# Patient Record
Sex: FEMALE | Race: WHITE | HISPANIC OR LATINO | Employment: UNEMPLOYED | URBAN - NONMETROPOLITAN AREA
[De-identification: names, ages, dates, MRNs, and addresses within clinical notes are randomized per-mention and may not be internally consistent; named-entity substitution may affect disease eponyms.]

---

## 2018-07-27 ENCOUNTER — HISTORICAL (OUTPATIENT)
Dept: ADMINISTRATIVE | Facility: HOSPITAL | Age: 53
End: 2018-07-27

## 2018-07-31 ENCOUNTER — HISTORICAL (OUTPATIENT)
Dept: ADMINISTRATIVE | Facility: HOSPITAL | Age: 53
End: 2018-07-31

## 2018-08-13 ENCOUNTER — HISTORICAL (OUTPATIENT)
Dept: ADMINISTRATIVE | Facility: HOSPITAL | Age: 53
End: 2018-08-13

## 2018-08-15 ENCOUNTER — HISTORICAL (OUTPATIENT)
Dept: ADMINISTRATIVE | Facility: HOSPITAL | Age: 53
End: 2018-08-15

## 2018-12-13 ENCOUNTER — HISTORICAL (OUTPATIENT)
Dept: ADMINISTRATIVE | Facility: HOSPITAL | Age: 53
End: 2018-12-13

## 2019-02-10 ENCOUNTER — HISTORICAL (OUTPATIENT)
Dept: ADMINISTRATIVE | Facility: HOSPITAL | Age: 54
End: 2019-02-10

## 2019-02-13 ENCOUNTER — HISTORICAL (OUTPATIENT)
Dept: ADMINISTRATIVE | Facility: HOSPITAL | Age: 54
End: 2019-02-13

## 2019-02-15 ENCOUNTER — HISTORICAL (OUTPATIENT)
Dept: ADMINISTRATIVE | Facility: HOSPITAL | Age: 54
End: 2019-02-15

## 2019-02-16 ENCOUNTER — HISTORICAL (OUTPATIENT)
Dept: ADMINISTRATIVE | Facility: HOSPITAL | Age: 54
End: 2019-02-16

## 2019-06-04 ENCOUNTER — HISTORICAL (OUTPATIENT)
Dept: ADMINISTRATIVE | Facility: HOSPITAL | Age: 54
End: 2019-06-04

## 2019-08-14 ENCOUNTER — HISTORICAL (OUTPATIENT)
Dept: ADMINISTRATIVE | Facility: HOSPITAL | Age: 54
End: 2019-08-14

## 2019-08-21 ENCOUNTER — HISTORICAL (OUTPATIENT)
Dept: ADMINISTRATIVE | Facility: HOSPITAL | Age: 54
End: 2019-08-21

## 2019-11-08 ENCOUNTER — HISTORICAL (OUTPATIENT)
Dept: ADMINISTRATIVE | Facility: HOSPITAL | Age: 54
End: 2019-11-08

## 2020-03-04 ENCOUNTER — HISTORICAL (OUTPATIENT)
Dept: ADMINISTRATIVE | Facility: HOSPITAL | Age: 55
End: 2020-03-04

## 2021-05-11 ENCOUNTER — HISTORICAL (OUTPATIENT)
Dept: ADMINISTRATIVE | Facility: HOSPITAL | Age: 56
End: 2021-05-11

## 2021-06-14 ENCOUNTER — HISTORICAL (OUTPATIENT)
Dept: ADMINISTRATIVE | Facility: HOSPITAL | Age: 56
End: 2021-06-14

## 2021-08-04 LAB
ABS NEUT (OLG): 2.4 X10(3)/MCL (ref 1.5–6.9)
ALBUMIN SERPL-MCNC: 3.3 GM/DL (ref 3.5–5)
ALBUMIN/GLOB SERPL: 1.2 RATIO (ref 1.1–2)
ALP SERPL-CCNC: 103 UNIT/L (ref 40–150)
ALT SERPL-CCNC: 16 UNIT/L (ref 0–55)
APTT PPP: 31.4 SEC (ref 23.4–34.9)
AST SERPL-CCNC: 15 UNIT/L (ref 5–34)
BASOPHILS # BLD AUTO: 0 X10(3)/MCL (ref 0–0.1)
BASOPHILS NFR BLD AUTO: 1 % (ref 0–1)
BILIRUB SERPL-MCNC: 0.3 MG/DL
BILIRUBIN DIRECT+TOT PNL SERPL-MCNC: 0.1 MG/DL (ref 0–0.5)
BILIRUBIN DIRECT+TOT PNL SERPL-MCNC: 0.2 MG/DL (ref 0–0.8)
BUN SERPL-MCNC: 9 MG/DL (ref 9.8–20.1)
CALCIUM SERPL-MCNC: 9.2 MG/DL (ref 8.4–10.2)
CHLORIDE SERPL-SCNC: 108 MMOL/L (ref 98–107)
CO2 SERPL-SCNC: 30 MMOL/L (ref 22–29)
CREAT SERPL-MCNC: 0.82 MG/DL (ref 0.55–1.02)
EOSINOPHIL # BLD AUTO: 0.2 X10(3)/MCL (ref 0–0.6)
EOSINOPHIL NFR BLD AUTO: 4 % (ref 0–5)
ERYTHROCYTE [DISTWIDTH] IN BLOOD BY AUTOMATED COUNT: 12.8 % (ref 11.5–17)
EST CREAT CLEARANCE SER (OHS): 73.05 ML/MIN
GLOBULIN SER-MCNC: 2.8 GM/DL (ref 2.4–3.5)
GLUCOSE SERPL-MCNC: 96 MG/DL (ref 74–100)
HCT VFR BLD AUTO: 35.1 % (ref 36–48)
HGB BLD-MCNC: 11.4 GM/DL (ref 12–16)
IMM GRANULOCYTES # BLD AUTO: 0.02 10*3/UL (ref 0–0.02)
IMM GRANULOCYTES NFR BLD AUTO: 0.3 % (ref 0–0.43)
INR PPP: 1 (ref 2–3)
LYMPHOCYTES # BLD AUTO: 2.6 X10(3)/MCL (ref 0.5–4.1)
LYMPHOCYTES NFR BLD AUTO: 45 % (ref 15–40)
MCH RBC QN AUTO: 31 PG (ref 27–34)
MCHC RBC AUTO-ENTMCNC: 32 GM/DL (ref 31–36)
MCV RBC AUTO: 94 FL (ref 80–99)
MONOCYTES # BLD AUTO: 0.5 X10(3)/MCL (ref 0–1.1)
MONOCYTES NFR BLD AUTO: 9 % (ref 4–12)
NEUTROPHILS # BLD AUTO: 2.4 X10(3)/MCL (ref 1.5–6.9)
NEUTROPHILS NFR BLD AUTO: 42 % (ref 43–75)
PLATELET # BLD AUTO: 325 X10(3)/MCL (ref 140–400)
PMV BLD AUTO: 9.7 FL (ref 6.8–10)
POTASSIUM SERPL-SCNC: 4.1 MMOL/L (ref 3.5–5.1)
PROT SERPL-MCNC: 6.1 GM/DL (ref 6.4–8.3)
PROTHROMBIN TIME: 12.8 SEC (ref 11.7–14.5)
RBC # BLD AUTO: 3.73 X10(6)/MCL (ref 4.2–5.4)
SARS-COV-2 AG RESP QL IA.RAPID: NOT DETECTED
SODIUM SERPL-SCNC: 140 MMOL/L (ref 136–145)
WBC # SPEC AUTO: 5.9 X10(3)/MCL (ref 4.5–11.5)

## 2021-08-06 ENCOUNTER — HISTORICAL (OUTPATIENT)
Dept: ANESTHESIOLOGY | Facility: HOSPITAL | Age: 56
End: 2021-08-06

## 2021-08-18 ENCOUNTER — HISTORICAL (OUTPATIENT)
Dept: ADMINISTRATIVE | Facility: HOSPITAL | Age: 56
End: 2021-08-18

## 2021-08-28 ENCOUNTER — HISTORICAL (OUTPATIENT)
Dept: ADMINISTRATIVE | Facility: HOSPITAL | Age: 56
End: 2021-08-28

## 2021-10-13 ENCOUNTER — HISTORICAL (OUTPATIENT)
Dept: HEMATOLOGY/ONCOLOGY | Facility: CLINIC | Age: 56
End: 2021-10-13

## 2021-10-14 ENCOUNTER — HISTORICAL (OUTPATIENT)
Dept: ADMINISTRATIVE | Facility: HOSPITAL | Age: 56
End: 2021-10-14

## 2022-04-10 ENCOUNTER — HISTORICAL (OUTPATIENT)
Dept: ADMINISTRATIVE | Facility: HOSPITAL | Age: 57
End: 2022-04-10
Payer: MEDICAID

## 2022-04-25 VITALS
DIASTOLIC BLOOD PRESSURE: 68 MMHG | WEIGHT: 130.06 LBS | BODY MASS INDEX: 25.53 KG/M2 | HEIGHT: 60 IN | SYSTOLIC BLOOD PRESSURE: 110 MMHG

## 2022-04-30 NOTE — OP NOTE
ADMITTING DIAGNOSIS:  Left carpal tunnel syndrome.    PROCEDURE:  Left carpal tunnel release.    INDICATIONS FOR PROCEDURE:  Patient is a 56-year-old  female with a history of anemia, iron deficiency type, with anxiety, depressive disorders, has left carpal tunnel syndrome that required left carpal tunnel release.  Patient has had EMG studies that show moderate to severe left carpal tunnel syndrome on 07/09/2021.    DESCRIPTION OF PROCEDURE:  Patient was brought to the operating room, underwent Esmarch and tourniquet with local and IV sedation.  Prepped and draped in a sterile fashion.  Had an incision made along the palmar crease and dissection through skin and subcutaneous tissue.  The carpal fascia was opened, and the median nerve was released.  The patient underwent closure of the skin and subcu with 4-0 plain gut suture and 4-0 nylon.  Sterile dressings were applied.  No problems were encountered.  Patient tolerated the procedure well.     I appreciate the consultation referral from nurse practitioner, Ms. Kim Szymanski.        KATHIA/JULI   DD: 08/06/2021 1331   DT: 08/06/2021 1352  Job # [id number]    cc: Kim Szymanski NP

## 2022-05-13 ENCOUNTER — HISTORICAL (OUTPATIENT)
Dept: ADMINISTRATIVE | Facility: HOSPITAL | Age: 57
End: 2022-05-13

## 2022-05-17 ENCOUNTER — OFFICE VISIT (OUTPATIENT)
Dept: ORTHOPEDICS | Facility: CLINIC | Age: 57
End: 2022-05-17
Payer: MEDICAID

## 2022-05-17 VITALS
RESPIRATION RATE: 16 BRPM | BODY MASS INDEX: 27.01 KG/M2 | HEIGHT: 59 IN | SYSTOLIC BLOOD PRESSURE: 103 MMHG | DIASTOLIC BLOOD PRESSURE: 70 MMHG | WEIGHT: 134 LBS | HEART RATE: 90 BPM | OXYGEN SATURATION: 96 % | TEMPERATURE: 98 F

## 2022-05-17 DIAGNOSIS — M17.0 PRIMARY OSTEOARTHRITIS OF BOTH KNEES: Primary | ICD-10-CM

## 2022-05-17 DIAGNOSIS — M62.81 MUSCLE WEAKNESS OF LOWER EXTREMITY: ICD-10-CM

## 2022-05-17 PROCEDURE — 20610 DRAIN/INJ JOINT/BURSA W/O US: CPT | Mod: 50,S$PBB,, | Performed by: NURSE PRACTITIONER

## 2022-05-17 PROCEDURE — 99214 PR OFFICE/OUTPT VISIT, EST, LEVL IV, 30-39 MIN: ICD-10-PCS | Mod: 25,S$PBB,, | Performed by: NURSE PRACTITIONER

## 2022-05-17 PROCEDURE — 99214 OFFICE O/P EST MOD 30 MIN: CPT | Mod: PBBFAC | Performed by: NURSE PRACTITIONER

## 2022-05-17 PROCEDURE — 20610 PR DRAIN/INJECT LARGE JOINT/BURSA: ICD-10-PCS | Mod: 50,S$PBB,, | Performed by: NURSE PRACTITIONER

## 2022-05-17 PROCEDURE — 3078F PR MOST RECENT DIASTOLIC BLOOD PRESSURE < 80 MM HG: ICD-10-PCS | Mod: CPTII,,, | Performed by: NURSE PRACTITIONER

## 2022-05-17 PROCEDURE — 3008F BODY MASS INDEX DOCD: CPT | Mod: CPTII,,, | Performed by: NURSE PRACTITIONER

## 2022-05-17 PROCEDURE — 1160F RVW MEDS BY RX/DR IN RCRD: CPT | Mod: CPTII,,, | Performed by: NURSE PRACTITIONER

## 2022-05-17 PROCEDURE — 3078F DIAST BP <80 MM HG: CPT | Mod: CPTII,,, | Performed by: NURSE PRACTITIONER

## 2022-05-17 PROCEDURE — 3074F PR MOST RECENT SYSTOLIC BLOOD PRESSURE < 130 MM HG: ICD-10-PCS | Mod: CPTII,,, | Performed by: NURSE PRACTITIONER

## 2022-05-17 PROCEDURE — 1159F PR MEDICATION LIST DOCUMENTED IN MEDICAL RECORD: ICD-10-PCS | Mod: CPTII,,, | Performed by: NURSE PRACTITIONER

## 2022-05-17 PROCEDURE — 1159F MED LIST DOCD IN RCRD: CPT | Mod: CPTII,,, | Performed by: NURSE PRACTITIONER

## 2022-05-17 PROCEDURE — 4010F ACE/ARB THERAPY RXD/TAKEN: CPT | Mod: CPTII,,, | Performed by: NURSE PRACTITIONER

## 2022-05-17 PROCEDURE — 99214 OFFICE O/P EST MOD 30 MIN: CPT | Mod: 25,S$PBB,, | Performed by: NURSE PRACTITIONER

## 2022-05-17 PROCEDURE — 3074F SYST BP LT 130 MM HG: CPT | Mod: CPTII,,, | Performed by: NURSE PRACTITIONER

## 2022-05-17 PROCEDURE — 1160F PR REVIEW ALL MEDS BY PRESCRIBER/CLIN PHARMACIST DOCUMENTED: ICD-10-PCS | Mod: CPTII,,, | Performed by: NURSE PRACTITIONER

## 2022-05-17 PROCEDURE — 3008F PR BODY MASS INDEX (BMI) DOCUMENTED: ICD-10-PCS | Mod: CPTII,,, | Performed by: NURSE PRACTITIONER

## 2022-05-17 PROCEDURE — 20610 DRAIN/INJ JOINT/BURSA W/O US: CPT | Mod: PBBFAC | Performed by: NURSE PRACTITIONER

## 2022-05-17 PROCEDURE — 4010F PR ACE/ARB THEARPY RXD/TAKEN: ICD-10-PCS | Mod: CPTII,,, | Performed by: NURSE PRACTITIONER

## 2022-05-17 RX ORDER — TRIAMCINOLONE ACETONIDE 40 MG/ML
40 INJECTION, SUSPENSION INTRA-ARTICULAR; INTRAMUSCULAR
Status: COMPLETED | OUTPATIENT
Start: 2022-05-17 | End: 2022-05-17

## 2022-05-17 RX ORDER — LIDOCAINE HYDROCHLORIDE 10 MG/ML
5 INJECTION, SOLUTION EPIDURAL; INFILTRATION; INTRACAUDAL; PERINEURAL
Status: COMPLETED | OUTPATIENT
Start: 2022-05-17 | End: 2022-05-17

## 2022-05-17 RX ORDER — MELOXICAM 15 MG/1
TABLET ORAL
COMMUNITY
Start: 2022-05-14 | End: 2023-08-09

## 2022-05-17 RX ORDER — LISINOPRIL 5 MG/1
TABLET ORAL
COMMUNITY
Start: 2022-05-14

## 2022-05-17 RX ORDER — GABAPENTIN 800 MG/1
800 TABLET ORAL NIGHTLY
COMMUNITY
Start: 2021-12-04 | End: 2023-08-09

## 2022-05-17 RX ORDER — QUETIAPINE FUMARATE 400 MG/1
400 TABLET, FILM COATED ORAL DAILY
COMMUNITY
Start: 2022-04-22

## 2022-05-17 RX ORDER — DICLOFENAC SODIUM 10 MG/G
GEL TOPICAL
COMMUNITY
Start: 2021-11-24 | End: 2023-08-09

## 2022-05-17 NOTE — PROGRESS NOTES
"  Subjective:       Follow up .  Patient ID: Roya Wills is a 57 y.o. female. smoker. Employment HX: , currently employed.    Seen [facility] for same DX since 10/14/21. PCP: Dr. Arron Jeronimo, referral source same.    Chief Complaint: Pain of the Left Knee, Pain of the Right Knee, and Follow-up    HPI:    Bilateral L > R aching and shooting medial knee pain.   Injury: no known injury  Onset: several years ago fluctuates   Modifying Factors: worse with activity, improves with rest, stiffness after immobilization, improves with less than 30 minutes activity and increased at night  Associated Symptoms: crepitus, swelling, "giving out" and decreased ROM   Activity: sedentary with light activity and pain mildly interferes with ADLs   Previous Treatments:  CSI since  last injection 22, RX PT completed 4 wks/ 2 xs per week d/c'd n/a currently in PT, BMI reduction ongoing education, RX NSAIDs and HEP withTheraBand with some relief but symptoms returning  PMH: + GI bleeding  Family History: + OA    Note: Weakness in LE improving since starting PT slowly.  CSI with great relief, symptoms returning, requesting CSI today s/t symptoms affecting ADLs    Current Outpatient Medications:     diclofenac sodium (VOLTAREN) 1 % Gel, SMARTSI Gram(s) Topical 2-3 Times Daily PRN, Disp: , Rfl:     gabapentin (NEURONTIN) 800 MG tablet, Take 800 mg by mouth every evening., Disp: , Rfl:     lisinopriL (PRINIVIL,ZESTRIL) 5 MG tablet, , Disp: , Rfl:     meloxicam (MOBIC) 15 MG tablet, , Disp: , Rfl:     QUEtiapine (SEROQUEL) 400 MG tablet, Take 400 mg by mouth once daily., Disp: , Rfl:     Current Facility-Administered Medications:     LIDOcaine (PF) 10 mg/ml (1%) injection 50 mg, 5 mL, Other, 1 time in Clinic/HOD, Elle Garber, NP    LIDOcaine (PF) 10 mg/ml (1%) injection 50 mg, 5 mL, Other, 1 time in Clinic/HOD, Elle Garber, NP    triamcinolone acetonide injection 40 mg, 40 mg, " "Intramuscular, 1 time in Clinic/HOD, Elle Garber NP    triamcinolone acetonide injection 40 mg, 40 mg, Intramuscular, 1 time in Clinic/HOD, Elle Garber NP    Review of patient's allergies indicates:   Allergen Reactions    Ibuprofen      Other reaction(s): Abdominal Pain, Stomach ulcer    Sulfamethoxazole-trimethoprim Nausea And Vomiting       No results found for: LABA1C   Body mass index is 27.06 kg/m².   Vitals:    05/17/22 1049   BP: 103/70   Pulse: 90   Resp: 16   Temp: 98 °F (36.7 °C)   SpO2: 96%   Weight: 60.8 kg (134 lb)   Height: 4' 11" (1.499 m)   PainSc:   7      Review of Systems:  A ten-point review of systems was performed and is negative, except as mentioned above       Objective:        MSK Bilateral Knee    General:  apparent distress, no pain indicators                                                                                                                  Inspection: limping gait, mild effusion, full weight bearing, no erythema, no contusion, mild quad deconditioning, varus deformity   Palpation: tenderness with palpation at medial joint line, peripatellar soft tissue    ROM:     Active Flexion / Extension (0-140):  2 / 114 painful (R)  3 / 119 painful (L)    Strength:    Flexion  4 / 5 (R)   4 / 5 (L)   Extension  4 / 5 (R)   4 / 5 (L)    Special Testing:   IT Band Testing  o Tequila's Test:    + (R)  + (L)   Effusion Testing  o Ballotable Effusion:   -- (R)  -- (L)  o Fluid Wave:    -- (R)  -- (L)   Patellar Testing  o Patellar Grind:    + (R)  + (L)  o Patellar Facet Pain:   + (R)  + (L)  o Apprehension:    -- (R)  -- (L)   Meniscal Testing  o Eboni's test:     + (R)  + (L)  o Thessaly's Test:      Not tested (R)  not tested (L)   Ligament Testing  o ACL Anterior Drawer:   -- (R) -- (L)  o PCL Posterior Drawer:   -- (R) -- (L)  o LCL Varus Test:    -- (R) -- (L)  o MCL Valgus Test:   -- (R) -- (L)    Hip Exam normal  Ankle Exam normal  Neurovascular: Intact " to light touch  Neuro/ Psych: Awake, alert, oriented, normal mood and affect  Lymphatic: No LAD  Skin/ Soft Tissue: no rash, skin intact    Assessment:       Imaging: X-ray dated 10/14/21 reviewed and independently interpreted by me.  Discussed with patient. Noted no acute, DJD noted.     [facility] Radiology Report 10/14/21   XR Knee Right 4 or More Views  HISTORY:  COMPARISON: None.  FINDINGS:  No acute displaced fractures or dislocations.  There is some narrowing of the medial compartment of the knee joint  articular spaces are otherwise preserved with smooth articular  surfaces  No blastic or lytic lesions.  Soft tissues within normal limits.  IMPRESSION:  Degenerative changes  Radiology Report 10/14/21  XR Knee Left 4 or More Views  HISTORY:  COMPARISON: None.  FINDINGS:  No acute displaced fractures or dislocations.  There is some narrowing of the medial compartment of the knee joint  articular spaces are otherwise preserved on the standing projection  there is narrowing of the medial compartment of the contralateral knee  No blastic or lytic lesions.  Soft tissues within normal limits.  IMPRESSION:  Degenerative changes.    1. Primary osteoarthritis of both knees    2. BMI 27.0-27.9,adult          Plan:       Orders Placed This Encounter    triamcinolone acetonide injection 40 mg    triamcinolone acetonide injection 40 mg    LIDOcaine (PF) 10 mg/ml (1%) injection 50 mg    LIDOcaine (PF) 10 mg/ml (1%) injection 50 mg     1. Ongoing education about DX and treatment recommendations including conservative treatments of daily HEP with TheraBand, BMI reduction goal 5-10% of body weight (120# overall goal), muscle strengthening with use of stationary bike (RPM set at 80 or > with slow progression to goal of 40 minutes 3-4 times per week as tolerated), adequate vit D/C, glucosamine 1500 mg/day and daily acetaminophen 1000 mg 3 times/ day if able to tolerate.    2. Treatment plan: Mild-to-moderate knee arthritis  Currently having adequate relief with current treatment plan. Intra-articular injections today due to return of symptoms since last injection which are impacting ADLs.   Continue in PT   3. Procedure: CSI v. VS injections discussed, s/t failed conservative treatments patient consents to CSI today  4. RX Medications: continue medications as RX per PCP  5. RTC: 4 months  Elle BARNES    Ortho Procedure Note CSI  Date: 05/17/2022  Time: 11:10 AM CDT   Procedure: bilateral Knee Injection   Indications: Therapeutic Indication - Decrease pain, Increase range of motion and improve quality of life:   Risks:  Possible complications with the injection include bleeding, infection (.01%), tendon rupture, steroid flare, fat pad or soft tissue atrophy, skin depigmentation, allergic reaction to medications and vasovagal response. (steroid flare treatment is rest, ice, NSAIDs and resolves in 24-36 hours.)  Consent:  Procedure, risks, benefits, and alternatives explained the patient, who voiced understanding and agreed to proceed with procedure.  Consent signed and scanned into the medical record.   No absolute contraindications (cellulitis overlying joint, infection, lack of informed consent, allergy to injection medication, AVN protein or egg allergy for sodium hyaluronate, or history of steroid flare) or relative contraindications (uncontrolled DM2 A1c>10, coagulopathy, INR > 3.5, previous joint replacement or history of AVN).        Staff: Elle BARNES  Description:  Time-out performed.  The patient was prepped in normal sterile fashion use of chlorhexidine scrub and the appropriate and anatomic landmarks were identified.  Sterile 21 gauge 1.5 inch  was used. Topical ethyl chloride was applied. Contents of syringe included: 5cc of 1% of lidocaine with 40mg of Kenalog   Complications: None   EBL: None   Post Procedure: Patient alert, and moving all extremities. ROM improved, pain decreased.  Good peripheral  pulses, no signs of vascular compromise and range of motion intact.  Aftercare instructions were given to patient at time of discharge.  Relative rest for 3 days-avoiding excess activity.  Place ice on the area for 15 minutes every 4-6 hours. Patient may take Tylenol a 1000 mg b.i.d. or ibuprofen 600 mg t.i.d. for the next 3-4 days if not on medication already and safe to take pending co-morbidities.  Protect the area for the next 1-8 hours if anesthetic was used.  Avoid excessive activity for the next 3-4 weeks.  ER precautions given for fever, severe joint pain or allergic reaction or other new symptoms related to the joint injection.       Timed Billing Note  Total Time Spent with Patient: 30 minutes  Visit Start Time: 1145  10 minutes spent prior to exam reviewing EMR, prior labs and x-rays.  10 minutes spent in exam with patient completing exam, obtaining history, educating on DX and treatment plan.  10 minutes spent after exam completing EHR documentation.   Visit End Time: 1215

## 2022-05-27 ENCOUNTER — HISTORICAL (OUTPATIENT)
Dept: ADMINISTRATIVE | Facility: HOSPITAL | Age: 57
End: 2022-05-27

## 2022-08-05 LAB
CHLAMYDIA NUCLEIC ACID SCREEN: NEGATIVE
GONOCOCCUS BY NUCLEIC ACID AMP: NEGATIVE
HUMAN PAPILLOMAVIRUS (HPV): NORMAL
HUMAN PAPILLOMAVIRUS (HPV): NORMAL
PAP RECOMMENDATION EXT: NORMAL
PAP SMEAR: NORMAL
TRICHOMONAS VAGINALIS: NEGATIVE

## 2022-09-20 ENCOUNTER — OFFICE VISIT (OUTPATIENT)
Dept: ORTHOPEDICS | Facility: CLINIC | Age: 57
End: 2022-09-20
Payer: MEDICAID

## 2022-09-20 VITALS — WEIGHT: 131 LBS | BODY MASS INDEX: 26.41 KG/M2 | HEIGHT: 59 IN

## 2022-09-20 DIAGNOSIS — M17.0 PRIMARY OSTEOARTHRITIS OF BOTH KNEES: Primary | ICD-10-CM

## 2022-09-20 PROCEDURE — 20610 LARGE JOINT ASPIRATION/INJECTION: CSI BILATERAL KNEE: ICD-10-PCS | Mod: 50,S$PBB,, | Performed by: NURSE PRACTITIONER

## 2022-09-20 PROCEDURE — 1160F PR REVIEW ALL MEDS BY PRESCRIBER/CLIN PHARMACIST DOCUMENTED: ICD-10-PCS | Mod: CPTII,,, | Performed by: NURSE PRACTITIONER

## 2022-09-20 PROCEDURE — 99214 OFFICE O/P EST MOD 30 MIN: CPT | Mod: 25,S$PBB,, | Performed by: NURSE PRACTITIONER

## 2022-09-20 PROCEDURE — 1160F RVW MEDS BY RX/DR IN RCRD: CPT | Mod: CPTII,,, | Performed by: NURSE PRACTITIONER

## 2022-09-20 PROCEDURE — 99214 PR OFFICE/OUTPT VISIT, EST, LEVL IV, 30-39 MIN: ICD-10-PCS | Mod: 25,S$PBB,, | Performed by: NURSE PRACTITIONER

## 2022-09-20 PROCEDURE — 99213 OFFICE O/P EST LOW 20 MIN: CPT | Mod: PBBFAC,25 | Performed by: NURSE PRACTITIONER

## 2022-09-20 PROCEDURE — 4010F PR ACE/ARB THEARPY RXD/TAKEN: ICD-10-PCS | Mod: CPTII,,, | Performed by: NURSE PRACTITIONER

## 2022-09-20 PROCEDURE — 20610 DRAIN/INJ JOINT/BURSA W/O US: CPT | Mod: 50,PBBFAC | Performed by: NURSE PRACTITIONER

## 2022-09-20 PROCEDURE — 3008F BODY MASS INDEX DOCD: CPT | Mod: CPTII,,, | Performed by: NURSE PRACTITIONER

## 2022-09-20 PROCEDURE — 3008F PR BODY MASS INDEX (BMI) DOCUMENTED: ICD-10-PCS | Mod: CPTII,,, | Performed by: NURSE PRACTITIONER

## 2022-09-20 PROCEDURE — 1159F MED LIST DOCD IN RCRD: CPT | Mod: CPTII,,, | Performed by: NURSE PRACTITIONER

## 2022-09-20 PROCEDURE — 1159F PR MEDICATION LIST DOCUMENTED IN MEDICAL RECORD: ICD-10-PCS | Mod: CPTII,,, | Performed by: NURSE PRACTITIONER

## 2022-09-20 PROCEDURE — 4010F ACE/ARB THERAPY RXD/TAKEN: CPT | Mod: CPTII,,, | Performed by: NURSE PRACTITIONER

## 2022-09-20 RX ORDER — TRIAMCINOLONE ACETONIDE 40 MG/ML
40 INJECTION, SUSPENSION INTRA-ARTICULAR; INTRAMUSCULAR
Status: DISCONTINUED | OUTPATIENT
Start: 2022-09-20 | End: 2024-03-21

## 2022-09-20 RX ORDER — TRIAMCINOLONE ACETONIDE 40 MG/ML
40 INJECTION, SUSPENSION INTRA-ARTICULAR; INTRAMUSCULAR
Status: DISCONTINUED | OUTPATIENT
Start: 2022-09-20 | End: 2022-09-20 | Stop reason: HOSPADM

## 2022-09-20 RX ORDER — LIDOCAINE HYDROCHLORIDE 10 MG/ML
5 INJECTION, SOLUTION EPIDURAL; INFILTRATION; INTRACAUDAL; PERINEURAL
Status: COMPLETED | OUTPATIENT
Start: 2022-09-20 | End: 2023-08-18

## 2022-09-20 RX ORDER — LIDOCAINE HYDROCHLORIDE 10 MG/ML
5 INJECTION, SOLUTION EPIDURAL; INFILTRATION; INTRACAUDAL; PERINEURAL
Status: DISCONTINUED | OUTPATIENT
Start: 2022-09-20 | End: 2022-09-20 | Stop reason: HOSPADM

## 2022-09-20 RX ADMIN — TRIAMCINOLONE ACETONIDE 40 MG: 40 INJECTION, SUSPENSION INTRA-ARTICULAR; INTRAMUSCULAR at 11:09

## 2022-09-20 RX ADMIN — LIDOCAINE HYDROCHLORIDE 5 ML: 10 INJECTION, SOLUTION EPIDURAL; INFILTRATION; INTRACAUDAL; PERINEURAL at 11:09

## 2022-09-20 NOTE — PROGRESS NOTES
"  Subjective:       Follow up .  Patient ID: Roya Wills is a 57 y.o. female. smoker. Employment HX: , currently employed.    Seen [facility] for same DX since 10/14/21. PCP: Dr. Arron Jeronimo, referral source same.  Chief Complaint: Pain of the Left Knee, Pain of the Right Knee, and Follow-up    HPI:    Bilateral L > R aching and shooting medial knee pain.   Injury: no known injury  Onset: several years ago fluctuates   Modifying Factors: worse with activity, improves with rest, stiffness after immobilization, improves with less than 30 minutes activity and increased at night  Associated Symptoms: crepitus, swelling, "giving out" and decreased ROM   Activity: sedentary with light activity and pain mildly interferes with ADLs   Previous Treatments:  CSI since  last injection 22, RX PT completed 12 wks/ 2 xs per week d/c'd n/a currently in PT, BMI reduction ongoing education, RX NSAIDs and HEP withTheraBand with some relief but symptoms returning  PMH: + GI bleeding  Family History: + OA    Note: Weakness in LE continues to have improvement since starting PT slowly.  CSI with great relief, symptoms returning, requesting CSI today s/t symptoms affecting ADLs.     Current Outpatient Medications:     diclofenac sodium (VOLTAREN) 1 % Gel, SMARTSI Gram(s) Topical 2-3 Times Daily PRN, Disp: , Rfl:     gabapentin (NEURONTIN) 800 MG tablet, Take 800 mg by mouth every evening., Disp: , Rfl:     lisinopriL (PRINIVIL,ZESTRIL) 5 MG tablet, , Disp: , Rfl:     meloxicam (MOBIC) 15 MG tablet, , Disp: , Rfl:     QUEtiapine (SEROQUEL) 400 MG tablet, Take 400 mg by mouth once daily., Disp: , Rfl:     Current Facility-Administered Medications:     LIDOcaine (PF) 10 mg/ml (1%) injection 50 mg, 5 mL, Other, 1 time in Clinic/HOD, Elle Garber NP    LIDOcaine (PF) 10 mg/ml (1%) injection 50 mg, 5 mL, Other, 1 time in Clinic/HOD, Elle Garber NP    triamcinolone acetonide injection 40 mg, 40 " "mg, Intra-articular, 1 time in Clinic/HOD, Elle Garber NP    triamcinolone acetonide injection 40 mg, 40 mg, Intra-articular, 1 time in Clinic/HOD, Elle Garber NP    Review of patient's allergies indicates:   Allergen Reactions    Ibuprofen      Other reaction(s): Abdominal Pain, Stomach ulcer    Sulfamethoxazole-trimethoprim Nausea And Vomiting       No results found for: LABA1C   Body mass index is 26.46 kg/m².   Vitals:    09/20/22 1036   Weight: 59.4 kg (131 lb)   Height: 4' 11" (1.499 m)   PainSc:   7      Review of Systems:  A ten-point review of systems was performed and is negative, except as mentioned above   Objective:   MSK Bilateral Knee  General:  No apparent distress, no pain indicators, fragile appearing    Inspection: limping gait, mild effusion, full weight bearing, no erythema, no contusion, mild quad deconditioning, varus deformity   Palpation: BILATERAL knees tenderness with palpation at medial joint line, peripatellar soft tissue, non-tender patellar tendon, tibial plateau  ROM:    Active Flexion / Extension (0-140):  2 / 116 painful (R)  3 / 119 painful (L)  Strength:   Flexion  4 / 5 (R)   4 / 5 (L)  Extension  4 / 5 (R)   4 / 5 (L)  Special Testing:  IT Band Testing  Tequila's Test:    + (R)  + (L)  Effusion Testing  Ballotable Effusion:   -- (R)  -- (L)  Fluid Wave:    -- (R)  -- (L)  Patellar Testing  Patellar Grind:    + (R)  + (L)  Patellar Facet Pain:   + (R)  + (L)  Apprehension:    -- (R)  -- (L)  Meniscal Testing  Eboni's test:     + (R)  + (L)  Thessaly's Test:      Not tested (R)  not tested (L)  Ligament Testing  ACL Anterior Drawer:   -- (R) -- (L)  PCL Posterior Drawer:   -- (R) -- (L)  LCL Varus Test:    -- (R) -- (L)  MCL Valgus Test:   -- (R) -- (L)  Hip Exam normal  Ankle Exam normal  Neurovascular: Intact to light touch  Neuro/ Psych: Awake, alert, oriented, normal mood and affect  Lymphatic: No LAD  Skin/ Soft Tissue: no rash, skin intact  Assessment:     "   Imaging: X-ray dated 10/14/21 reviewed and independently interpreted by me.  Discussed with patient. Noted no acute, DJD noted.     [facility] Radiology Report 10/14/21   XR Knee Right 4 or More Views  HISTORY:  COMPARISON: None.  FINDINGS:  No acute displaced fractures or dislocations.  There is some narrowing of the medial compartment of the knee joint  articular spaces are otherwise preserved with smooth articular  surfaces  No blastic or lytic lesions.  Soft tissues within normal limits.  IMPRESSION:  Degenerative changes  Radiology Report 10/14/21  XR Knee Left 4 or More Views  HISTORY:  COMPARISON: None.  FINDINGS:  No acute displaced fractures or dislocations.  There is some narrowing of the medial compartment of the knee joint  articular spaces are otherwise preserved on the standing projection  there is narrowing of the medial compartment of the contralateral knee  No blastic or lytic lesions.  Soft tissues within normal limits.  IMPRESSION:  Degenerative changes.    1. Primary osteoarthritis of both knees    2. BMI 26.0-26.9,adult      Plan:       Ongoing education about DX and treatment recommendations including conservative treatments of daily HEP with TheraBand, BMI reduction goal 5-10% of body weight (120# overall goal), muscle strengthening with use of stationary bike (RPM set at 80 or > with slow progression to goal of 40 minutes 3-4 times per week as tolerated), adequate vit D/C, glucosamine 1500 mg/day and daily acetaminophen 1000 mg 3 times/ day if able to tolerate.    Treatment plan: Mild-to-moderate knee arthritis bilateral Currently having adequate relief with current treatment plan. Intra-articular injections today due to return of symptoms since last injection which are impacting ADLs.   Continue in PT, RX for cane given to avoid falling/ injury prevention.  Will seek PA for VS injections at RTC.   Procedure: CSI v. VS injections discussed, s/t failed conservative treatments patient  consents to CSI today  RX Medications: continue medications as RX per PCP  RTC: 3 months  Elle Garber FNP    Large Joint Aspiration/Injection: CSI bilateral knee    Date/Time: 9/20/2022 11:00 AM  Performed by: Elle Garber NP  Authorized by: Elle Garber NP     Consent Done?:  Yes (Written)  Indications:  Pain and arthritis  Site marked: the procedure site was marked    Timeout: prior to procedure the correct patient, procedure, and site was verified      Local anesthesia used?: Yes    Local anesthetic:  Topical anesthetic    Details:  Needle Size:  21 G  Ultrasonic Guidance for needle placement?: No    Approach:  Lateral  Location:  Knee  Laterality:  Bilateral  Site:  Bilateral knee  Medications (Right):  5 mL LIDOcaine (PF) 10 mg/ml (1%) 10 mg/mL (1 %); 40 mg triamcinolone acetonide 40 mg/mL  Medications (Left):  5 mL LIDOcaine (PF) 10 mg/ml (1%) 10 mg/mL (1 %); 40 mg triamcinolone acetonide 40 mg/mL  Patient tolerance:  Patient tolerated the procedure well with no immediate complications     Topical ethyl chloride was applied. Contents of syringe included: 5cc of 1% of lidocaine (PF) with 40mg of Kenalog   Complications: None   EBL: None   Post Procedure: Patient alert, and moving all extremities. ROM improved, pain decreased.  Good peripheral pulses, no signs of vascular compromise and range of motion intact.  Aftercare instructions were given to patient at time of discharge.  Relative rest for 3 days-avoiding excess activity.  Place ice on the area for 15 minutes every 4-6 hours. Patient may take Tylenol a 1000 mg b.i.d. or ibuprofen 600 mg t.i.d. for the next 3-4 days if not on medication already and safe to take pending co-morbidities.  Protect the area for the next 1-8 hours if anesthetic was used.  Avoid excessive activity for the next 3-4 weeks.  ER precautions given for fever, severe joint pain or allergic reaction or other new symptoms related to the joint injection.          Timed Billing Note  Total Time Spent with Patient: 30 minutes  Visit Start Time: 1040  10 minutes spent prior to visit reviewing EMR, prior labs and x-rays.  10 minutes spent in visit with patient completing exam, obtaining history, educating on DX and treatment plan.  10 minutes spent after visit completing EMR documentation.   Visit End Time: 1110

## 2022-10-04 ENCOUNTER — HISTORICAL (OUTPATIENT)
Dept: ADMINISTRATIVE | Facility: HOSPITAL | Age: 57
End: 2022-10-04

## 2023-01-30 ENCOUNTER — DOCUMENTATION ONLY (OUTPATIENT)
Dept: ADMINISTRATIVE | Facility: HOSPITAL | Age: 58
End: 2023-01-30
Payer: MEDICAID

## 2023-06-02 ENCOUNTER — OFFICE VISIT (OUTPATIENT)
Dept: ORTHOPEDICS | Facility: CLINIC | Age: 58
End: 2023-06-02
Payer: MEDICAID

## 2023-06-02 ENCOUNTER — HOSPITAL ENCOUNTER (OUTPATIENT)
Dept: RADIOLOGY | Facility: HOSPITAL | Age: 58
Discharge: HOME | End: 2023-06-02
Attending: NURSE PRACTITIONER
Payer: MEDICAID

## 2023-06-02 VITALS — BODY MASS INDEX: 26.61 KG/M2 | RESPIRATION RATE: 18 BRPM | HEIGHT: 59 IN | WEIGHT: 132 LBS

## 2023-06-02 DIAGNOSIS — M17.0 PRIMARY OSTEOARTHRITIS OF BOTH KNEES: ICD-10-CM

## 2023-06-02 DIAGNOSIS — M17.0 PRIMARY OSTEOARTHRITIS OF BOTH KNEES: Primary | ICD-10-CM

## 2023-06-02 DIAGNOSIS — M23.91 INTERNAL DERANGEMENT OF KNEE, RIGHT: ICD-10-CM

## 2023-06-02 PROCEDURE — 99215 PR OFFICE/OUTPT VISIT, EST, LEVL V, 40-54 MIN: ICD-10-PCS | Mod: S$PBB,,, | Performed by: NURSE PRACTITIONER

## 2023-06-02 PROCEDURE — 1159F MED LIST DOCD IN RCRD: CPT | Mod: CPTII,,, | Performed by: NURSE PRACTITIONER

## 2023-06-02 PROCEDURE — 73564 X-RAY EXAM KNEE 4 OR MORE: CPT | Mod: TC,LT

## 2023-06-02 PROCEDURE — 3008F BODY MASS INDEX DOCD: CPT | Mod: CPTII,,, | Performed by: NURSE PRACTITIONER

## 2023-06-02 PROCEDURE — 73564 X-RAY EXAM KNEE 4 OR MORE: CPT | Mod: TC,RT

## 2023-06-02 PROCEDURE — 1160F RVW MEDS BY RX/DR IN RCRD: CPT | Mod: CPTII,,, | Performed by: NURSE PRACTITIONER

## 2023-06-02 PROCEDURE — 1159F PR MEDICATION LIST DOCUMENTED IN MEDICAL RECORD: ICD-10-PCS | Mod: CPTII,,, | Performed by: NURSE PRACTITIONER

## 2023-06-02 PROCEDURE — 4010F PR ACE/ARB THEARPY RXD/TAKEN: ICD-10-PCS | Mod: CPTII,,, | Performed by: NURSE PRACTITIONER

## 2023-06-02 PROCEDURE — 3008F PR BODY MASS INDEX (BMI) DOCUMENTED: ICD-10-PCS | Mod: CPTII,,, | Performed by: NURSE PRACTITIONER

## 2023-06-02 PROCEDURE — 1160F PR REVIEW ALL MEDS BY PRESCRIBER/CLIN PHARMACIST DOCUMENTED: ICD-10-PCS | Mod: CPTII,,, | Performed by: NURSE PRACTITIONER

## 2023-06-02 PROCEDURE — 99214 OFFICE O/P EST MOD 30 MIN: CPT | Mod: PBBFAC | Performed by: NURSE PRACTITIONER

## 2023-06-02 PROCEDURE — 99215 OFFICE O/P EST HI 40 MIN: CPT | Mod: S$PBB,,, | Performed by: NURSE PRACTITIONER

## 2023-06-02 PROCEDURE — 4010F ACE/ARB THERAPY RXD/TAKEN: CPT | Mod: CPTII,,, | Performed by: NURSE PRACTITIONER

## 2023-06-02 NOTE — PROGRESS NOTES
"  Subjective:   PATIENT ID: Roya Wills is a 58 y.o. female. Smoker. Employment HX: , currently employed.    Seen [facility] for same DX since .   CHIEF COMPLAINT: Pain of the Left Knee and Pain of the Right Knee    HPI:    Bilateral L > R aching medial knee pain.   Injury: no known injury  Onset: several years ago fluctuates   Modifying Factors: worse with activity, improves with rest, stiffness after immobilization, and improves with less than 30 minutes activity  Associated Symptoms: crepitus, decreased ROM, swelling, and "giving out"   Activity: sedentary with light activity and pain moderately interferes with ADLs   Previous Treatments:  BMI reduction ongoing education, HEP withTheraBand, RX NSAIDs, RX PT completed 12 wks/ 2 xs per week d/c'd 2022, and CSI since  last injection 22  with some relief but symptoms returning  PMH: + GI bleeding  Family History: + OA    NOTE: Follow up  for mild to moderate DJD bilateral knees with history of weakness improved with RX PT .  CSI given since  with last CSI given 22 with great relief, but symptoms returning over last few months and affecting ADLs.  We attempted PA from insurance but have not received denial or approval to date. Desires surgical treatment options for right knee due to symptoms more severe in right than left.       Current Outpatient Medications:     diclofenac sodium (VOLTAREN) 1 % Gel, SMARTSI Gram(s) Topical 2-3 Times Daily PRN, Disp: , Rfl:     gabapentin (NEURONTIN) 800 MG tablet, Take 800 mg by mouth every evening., Disp: , Rfl:     lisinopriL (PRINIVIL,ZESTRIL) 5 MG tablet, , Disp: , Rfl:     meloxicam (MOBIC) 15 MG tablet, , Disp: , Rfl:     QUEtiapine (SEROQUEL) 400 MG tablet, Take 400 mg by mouth once daily., Disp: , Rfl:     Current Facility-Administered Medications:     LIDOcaine (PF) 10 mg/ml (1%) injection 50 mg, 5 mL, Other, 1 time in Clinic/HOD, Elle Garber, SANDOR    LIDOcaine (PF) 10 mg/ml " "(1%) injection 50 mg, 5 mL, Other, 1 time in Clinic/HOD, Elle Garber NP    triamcinolone acetonide injection 40 mg, 40 mg, Intra-articular, 1 time in Clinic/HOD, Elle Garber NP    triamcinolone acetonide injection 40 mg, 40 mg, Intra-articular, 1 time in Clinic/HOD, Elle Garber NP  Review of patient's allergies indicates:   Allergen Reactions    Ibuprofen      Other reaction(s): Abdominal Pain, Stomach ulcer    Sulfamethoxazole-trimethoprim Nausea And Vomiting     No results found for: HGBA1C   Body mass index is 26.66 kg/m².   Vitals:    06/02/23 0810   Resp: 18   Weight: 59.9 kg (132 lb)   Height: 4' 11" (1.499 m)   PainSc: 10-Worst pain ever      REVIEW OF SYSTEMS:  A ten-point review of systems was performed and is negative, except as mentioned above   Objective:   MSK Bilateral Knee  General:  No apparent distress, no pain indicators, well nourished   Inspection: limping gait, mild effusion, full weight bearing, no erythema, no contusion, mild quad deconditioning, varus deformity   Palpation: BILATERAL knees tenderness with palpation at medial joint line, peripatellar soft tissue, non-tender: patellar tendon, tibial plateau  ROM:    Active Flexion / Extension (0-140):  0 / 116 painful (R)  0 / 119 painful (L)  Strength:   Flexion     4 / 5 (R)  4 / 5 (L)  Extension     4 / 5 (R)  4 / 5 (L)  Special Testing:  IT Band Testing  Tequila's Test:  -- (R)  -- (L)  Effusion Testing  Ballotable Effusion:   -- (R)  -- (L)  Fluid Wave:    -- (R)  -- (L)  Patellar Testing  Patellar Grind:    + (R)  + (L)  Patellar Facet Pain:   + (R)  + (L)  Apprehension:    -- (R)  -- (L)  Meniscal Testing  Eboni's test:    + (R)  + (L)  Thessaly's Test:    Not Tested (R)  Not Tested (L)  Ligament Testing  ACL Anterior Drawer:   -- (R) -- (L)  PCL Posterior Drawer:   -- (R) -- (L)  LCL Varus Test:    -- (R) -- (L)  MCL Valgus Test:    -- (R) -- (L)  Hip Exam normal  Ankle Exam normal  Neurovascular: Intact to " light touch  Neuro/ Psych: Awake, alert, oriented, normal mood and affect  Lymphatic: No LAD  Skin/ Soft Tissue: no rash, skin intact  Assessment:   IMAGING: X-ray 4 views of right and left knee ordered, reviewed and independently interpreted by me. Discussed with patient. Noted no acute findings, DJD noted, awaiting radiologist findings    EMR REVIEW: completed with noted prior visit 9/20/22 reviewed.    DIAGNOSIS:  1. Primary osteoarthritis of both knees    2. BMI 26.0-26.9,adult       Plan:     Orders Placed This Encounter    X-Ray Knee Complete 4 or More Views Left    X-Ray Knee Complete 4 Or More Views Right     Ongoing education about DX and treatment recommendations including conservative treatments of daily HEP with TheraBand, BMI reduction goal 5-10% of body weight (125# overall goal), muscle strengthening with use of stationary bike (RPM set at 80 or > with slow progression to goal of 40 minutes 3-4 times per week as tolerated), adequate vit D/C, glucosamine 1500 mg/day and daily acetaminophen 1000 mg 3 times/ day if able to tolerate.    Treatment plan: Moderate exacerbation of chronic Bilateral L < R Mild-to-moderate knee arthritis MRI ordered for RIGHT knee s/t failed conservative treatments including: RX NSAID, formal RX PT, CSI, and HEP > 3 months with inadequate relief.  Patient continues with symptoms of meniscal injury despite > 6 weeks treatment.  MRI required to evaluate need for surgical treatments.    Procedure: n/a  RX Medications: continue medications as RX per PCP.  RTC: after imaging complete  NOTE: None    Elle Garber FNP  Procedure Note:   None    Timed Billing Note  Total Time Spent with Patient: 40 minutes .  Visit Start Time: 0830  10 minutes spent prior to visit reviewing EMR, prior labs and x-rays.  20 minutes spent in visit with patient face-to-face time completing exam, obtaining history, educating on DX and treatment plan.  10 minutes spent after visit completing EMR  documentation.   Visit End Time: 0910

## 2023-06-29 ENCOUNTER — HOSPITAL ENCOUNTER (OUTPATIENT)
Dept: RADIOLOGY | Facility: HOSPITAL | Age: 58
Discharge: HOME | End: 2023-06-29
Attending: NURSE PRACTITIONER
Payer: MEDICAID

## 2023-06-29 DIAGNOSIS — M17.0 PRIMARY OSTEOARTHRITIS OF BOTH KNEES: ICD-10-CM

## 2023-06-29 PROCEDURE — 73721 MRI JNT OF LWR EXTRE W/O DYE: CPT | Mod: TC,RT

## 2023-07-10 ENCOUNTER — HOSPITAL ENCOUNTER (EMERGENCY)
Facility: HOSPITAL | Age: 58
Discharge: HOME | End: 2023-07-10
Attending: FAMILY MEDICINE
Payer: MEDICAID

## 2023-07-10 VITALS
WEIGHT: 130 LBS | OXYGEN SATURATION: 98 % | TEMPERATURE: 97 F | HEIGHT: 60 IN | SYSTOLIC BLOOD PRESSURE: 113 MMHG | HEART RATE: 88 BPM | RESPIRATION RATE: 18 BRPM | DIASTOLIC BLOOD PRESSURE: 79 MMHG | BODY MASS INDEX: 25.52 KG/M2

## 2023-07-10 DIAGNOSIS — K52.9 GASTROENTERITIS: Primary | ICD-10-CM

## 2023-07-10 LAB
ALBUMIN SERPL-MCNC: 3.9 G/DL (ref 3.4–5)
ALBUMIN/GLOB SERPL: 1.4 RATIO
ALP SERPL-CCNC: 123 UNIT/L (ref 50–144)
ALT SERPL-CCNC: 36 UNIT/L (ref 1–45)
ANION GAP SERPL CALC-SCNC: 3 MEQ/L (ref 2–13)
APPEARANCE UR: CLEAR
AST SERPL-CCNC: 34 UNIT/L (ref 14–36)
BASOPHILS # BLD AUTO: 0.03 X10(3)/MCL (ref 0.01–0.08)
BASOPHILS NFR BLD AUTO: 0.4 % (ref 0.1–1.2)
BILIRUB UR QL STRIP.AUTO: NEGATIVE MG/DL
BILIRUBIN DIRECT+TOT PNL SERPL-MCNC: 0.4 MG/DL (ref 0–1)
BUN SERPL-MCNC: 8 MG/DL (ref 7–20)
CALCIUM SERPL-MCNC: 9.4 MG/DL (ref 8.4–10.2)
CHLORIDE SERPL-SCNC: 107 MMOL/L (ref 98–110)
CO2 SERPL-SCNC: 30 MMOL/L (ref 21–32)
COLOR UR: YELLOW
CREAT SERPL-MCNC: 0.81 MG/DL (ref 0.66–1.25)
CREAT/UREA NIT SERPL: 10 (ref 12–20)
EOSINOPHIL # BLD AUTO: 0.18 X10(3)/MCL (ref 0.04–0.36)
EOSINOPHIL NFR BLD AUTO: 2.3 % (ref 0.7–7)
ERYTHROCYTE [DISTWIDTH] IN BLOOD BY AUTOMATED COUNT: 12.6 % (ref 11–14.5)
GFR SERPLBLD CREATININE-BSD FMLA CKD-EPI: 85 MLS/MIN/1.73/M2
GLOBULIN SER-MCNC: 2.8 GM/DL (ref 2–3.9)
GLUCOSE SERPL-MCNC: 100 MG/DL (ref 70–115)
GLUCOSE UR QL STRIP.AUTO: NEGATIVE MG/DL
HCT VFR BLD AUTO: 39 % (ref 36–48)
HGB BLD-MCNC: 13 G/DL (ref 11.8–16)
IMM GRANULOCYTES # BLD AUTO: 0.03 X10(3)/MCL (ref 0–0.03)
IMM GRANULOCYTES NFR BLD AUTO: 0.4 % (ref 0–0.5)
KETONES UR QL STRIP.AUTO: NEGATIVE MG/DL
LEUKOCYTE ESTERASE UR QL STRIP.AUTO: NEGATIVE UNIT/L
LYMPHOCYTES # BLD AUTO: 1.83 X10(3)/MCL (ref 1.16–3.74)
LYMPHOCYTES NFR BLD AUTO: 23.6 % (ref 20–55)
MCH RBC QN AUTO: 30.2 PG (ref 27–34)
MCHC RBC AUTO-ENTMCNC: 33.3 G/DL (ref 31–37)
MCV RBC AUTO: 90.7 FL (ref 79–99)
MONOCYTES # BLD AUTO: 0.52 X10(3)/MCL (ref 0.24–0.36)
MONOCYTES NFR BLD AUTO: 6.7 % (ref 4.7–12.5)
NEUTROPHILS # BLD AUTO: 5.16 X10(3)/MCL (ref 1.56–6.13)
NEUTROPHILS NFR BLD AUTO: 66.6 % (ref 37–73)
NITRITE UR QL STRIP.AUTO: NEGATIVE
NRBC BLD AUTO-RTO: 0 %
PH UR STRIP.AUTO: 6 [PH]
PLATELET # BLD AUTO: 357 X10(3)/MCL (ref 140–371)
PMV BLD AUTO: 9.3 FL (ref 9.4–12.4)
POTASSIUM SERPL-SCNC: 4 MMOL/L (ref 3.5–5.1)
PROT SERPL-MCNC: 6.7 GM/DL (ref 6.3–8.2)
PROT UR QL STRIP.AUTO: NEGATIVE MG/DL
RBC # BLD AUTO: 4.3 X10(6)/MCL (ref 4–5.1)
RBC UR QL AUTO: NEGATIVE UNIT/L
SARS-COV-2 RDRP RESP QL NAA+PROBE: NEGATIVE
SODIUM SERPL-SCNC: 140 MMOL/L (ref 135–145)
SP GR UR STRIP.AUTO: 1.01
UROBILINOGEN UR STRIP-ACNC: 0.2 MG/DL
WBC # SPEC AUTO: 7.75 X10(3)/MCL (ref 4–11.5)

## 2023-07-10 PROCEDURE — 63600175 PHARM REV CODE 636 W HCPCS: Performed by: NURSE PRACTITIONER

## 2023-07-10 PROCEDURE — 87635 SARS-COV-2 COVID-19 AMP PRB: CPT | Performed by: NURSE PRACTITIONER

## 2023-07-10 PROCEDURE — 25000003 PHARM REV CODE 250: Performed by: NURSE PRACTITIONER

## 2023-07-10 PROCEDURE — 80053 COMPREHEN METABOLIC PANEL: CPT | Performed by: NURSE PRACTITIONER

## 2023-07-10 PROCEDURE — 99285 EMERGENCY DEPT VISIT HI MDM: CPT | Mod: 25

## 2023-07-10 PROCEDURE — 36415 COLL VENOUS BLD VENIPUNCTURE: CPT | Performed by: NURSE PRACTITIONER

## 2023-07-10 PROCEDURE — 96361 HYDRATE IV INFUSION ADD-ON: CPT

## 2023-07-10 PROCEDURE — 85025 COMPLETE CBC W/AUTO DIFF WBC: CPT | Performed by: NURSE PRACTITIONER

## 2023-07-10 PROCEDURE — 96374 THER/PROPH/DIAG INJ IV PUSH: CPT

## 2023-07-10 PROCEDURE — 81003 URINALYSIS AUTO W/O SCOPE: CPT | Performed by: NURSE PRACTITIONER

## 2023-07-10 RX ORDER — SODIUM CHLORIDE 9 MG/ML
1000 INJECTION, SOLUTION INTRAVENOUS
Status: COMPLETED | OUTPATIENT
Start: 2023-07-10 | End: 2023-07-10

## 2023-07-10 RX ORDER — DICYCLOMINE HYDROCHLORIDE 20 MG/1
20 TABLET ORAL 3 TIMES DAILY PRN
Qty: 20 TABLET | Refills: 0 | Status: SHIPPED | OUTPATIENT
Start: 2023-07-10 | End: 2023-08-09

## 2023-07-10 RX ORDER — ONDANSETRON 2 MG/ML
8 INJECTION INTRAMUSCULAR; INTRAVENOUS
Status: COMPLETED | OUTPATIENT
Start: 2023-07-10 | End: 2023-07-10

## 2023-07-10 RX ORDER — ONDANSETRON 4 MG/1
4 TABLET, ORALLY DISINTEGRATING ORAL EVERY 6 HOURS PRN
Qty: 12 TABLET | Refills: 0 | Status: SHIPPED | OUTPATIENT
Start: 2023-07-10 | End: 2023-08-09

## 2023-07-10 RX ADMIN — SODIUM CHLORIDE 1000 ML: 9 INJECTION, SOLUTION INTRAVENOUS at 01:07

## 2023-07-10 RX ADMIN — ONDANSETRON 8 MG: 2 INJECTION INTRAMUSCULAR; INTRAVENOUS at 01:07

## 2023-07-10 NOTE — ED PROVIDER NOTES
Encounter Date: 7/10/2023       History     Chief Complaint   Patient presents with    Fever    Diarrhea    Vomiting     Fever, diarrhea, vomiting since Friday.       HPI  Review of patient's allergies indicates:   Allergen Reactions    Ibuprofen      Other reaction(s): Abdominal Pain, Stomach ulcer    Sulfamethoxazole-trimethoprim Nausea And Vomiting     Past Medical History:   Diagnosis Date    Known health problems: none      Past Surgical History:   Procedure Laterality Date    CARPAL TUNNEL RELEASE       Family History   Problem Relation Age of Onset    No Known Problems Sister     No Known Problems Brother      Social History     Tobacco Use    Smoking status: Former    Smokeless tobacco: Never   Substance Use Topics    Alcohol use: Never    Drug use: Never     Review of Systems   Gastrointestinal:  Positive for diarrhea, nausea and vomiting.   All other systems reviewed and are negative.    Physical Exam     Initial Vitals [07/10/23 1308]   BP Pulse Resp Temp SpO2   122/73 99 20 97 °F (36.1 °C) 97 %      MAP       --         Physical Exam    Nursing note and vitals reviewed.  Constitutional: She appears well-developed and well-nourished.   HENT:   Head: Normocephalic and atraumatic.   Eyes: EOM are normal. Pupils are equal, round, and reactive to light.   Neck:   Normal range of motion.  Cardiovascular:  Normal rate and regular rhythm.           Pulmonary/Chest: No respiratory distress.   Abdominal: Abdomen is soft. She exhibits no distension. There is abdominal tenderness.   Epigastric tenderness There is no rebound.   Musculoskeletal:      Cervical back: Normal range of motion.     Neurological: She is alert and oriented to person, place, and time. GCS score is 15. GCS eye subscore is 4. GCS verbal subscore is 5. GCS motor subscore is 6.   Skin: Capillary refill takes less than 2 seconds.   Psychiatric: She has a normal mood and affect. Thought content normal.       ED Course   Procedures  Labs Reviewed    COMPREHENSIVE METABOLIC PANEL - Abnormal; Notable for the following components:       Result Value    BUN/Creatinine Ratio 10 (*)     All other components within normal limits   CBC WITH DIFFERENTIAL - Abnormal; Notable for the following components:    MPV 9.3 (*)     Mono # 0.52 (*)     All other components within normal limits   SARS-COV-2 RNA AMPLIFICATION, QUAL - Normal   CBC W/ AUTO DIFFERENTIAL    Narrative:     The following orders were created for panel order CBC Auto Differential.  Procedure                               Abnormality         Status                     ---------                               -----------         ------                     CBC with Differential[[id number]]        Abnormal            Final result                 Please view results for these tests on the individual orders.   URINALYSIS, REFLEX TO URINE CULTURE    Narrative:      URINE STABILITY IS 2 HOURS AT ROOM TEMP OR    SIX HOURS REFRIGERATED. PERFORMING TESTING ON    SPECIMENS GREATER THAN THIS AGE MAY AFFECT THE    FOLLOWING TESTS:    PH          SPECIFIC GRAVITY           BLOOD    CLARITY     BILIRUBIN               UROBILINOGEN          Imaging Results              CT Abdomen Pelvis  Without Contrast (Final result)  Result time 07/10/23 15:17:45      Final result by Tamika Goodwin III, MD (07/10/23 15:17:45)                   Impression:      1. Chronic changes are present as described above.  See above comments for details.      Electronically signed by: Tamika Goodwin  Date:    07/10/2023  Time:    15:17               Narrative:    EXAMINATION:  CT ABDOMEN PELVIS WITHOUT CONTRAST    CLINICAL HISTORY AND TECHNIQUE:  RT Jeromy on 7/10/2023  2:46 PM    PT STATUS: ER    PROCEDURE: CT ABD/PEL W/O    CLINICAL HX : FEVER, DIARRHEA, VOMITING X 3 DAYS    PMH: N/A    IV CONTRAST: NONE    ORAL CONTRAST: NONE    RECTAL CONTRAST: NONE    AXIAL IMAGES @ 5MM INTERVALS WITH MULTIPLANAR RECONSTRUCTION    TOTAL IMAGE NUMBER:  155    NUMBER OF CT SCANS IN PAST 12 MONTHS: 2    CTDIvol(mGy): HEAD:     BODY: 5.7    DLP(mGycm): HEAD:     BODY: 314.70    TECH: AKG/CORIE    PT TRANSPORTED W/O INCIDENT    This patient has had 2 CT and nuclear medicine scans performed within the last 12 months.    The following DOSE REDUCTION TECHNIQUES are used for all CT scans at [facility]:    1. Automated exposure control.  2. Adjustment of the mA and/or kv according to patient size.  3. Use of iterative reconstruction technique.    COMPARISON:  02/10/2019    FINDINGS:  Liver: No clinically significant abnormalities are noted.    Gallbladder/biliary system: No clinically significant abnormalities are noted.    Spleen: No clinically significant abnormalities are noted.    Adrenal glands: No clinically significant abnormalities are noted.    Pancreas: No clinically significant abnormalities are noted.    Kidneys/ureters: Phleboliths within the pelvis make evaluation of the distal ureters more difficult.  I see no obvious ureteral stones or changes to suggest ureteral obstruction.    Urinary bladder: No clinically significant abnormalities are noted.    Uterus and ovaries: No clinically significant abnormalities are noted.    GI tract: Unopacified loops of large and small bowel as well as the gastric lumen and appendix are difficult to evaluate with no definite abnormalities appreciated.    Vascular structures: Mild atherosclerotic plaquing is noted within the abdominal aorta.    Musculoskeletal structures: Mild to moderate degenerative changes are noted involving the lumbar spine.    Miscellaneous: N/A                                       Medications   ondansetron injection 8 mg (8 mg Intravenous Given 7/10/23 1343)   0.9%  NaCl infusion (1,000 mLs Intravenous New Bag 7/10/23 1335)     Medical Decision Making:   Clinical Tests:   Lab Tests: Ordered and Reviewed  The following lab test(s) were unremarkable: CBC, CMP and  Urinalysis  Radiological Study: Ordered and Reviewed  ED Management:  Discussed home and self care  Increase fluids  Medications:  continue home medications  Prescriptions:  zofranmarlenyl                        Clinical Impression:   Final diagnoses:  [K52.9] Gastroenteritis (Primary)        ED Disposition Condition    Discharge Stable          ED Prescriptions       Medication Sig Dispense Start Date End Date Auth. Provider    ondansetron (ZOFRAN-ODT) 4 MG TbDL Take 1 tablet (4 mg total) by mouth every 6 (six) hours as needed (nausea). 12 tablet 7/10/2023 -- MOLLY Scott    dicyclomine (BENTYL) 20 mg tablet Take 1 tablet (20 mg total) by mouth 3 (three) times daily as needed (for cramping). 20 tablet 7/10/2023 -- MOLLY Scott          Follow-up Information       Follow up With Specialties Details Why Contact Info    [facility] Schedule an appointment as soon as possible for a visit  If symptoms worsen, As needed [address]  [facility] [address]  [phone]               MOLLY Scott  07/10/23 3617

## 2023-08-02 NOTE — PROGRESS NOTES
Chief Complaint: Annual exam    Chief Complaint   Patient presents with    Well Woman     Annual Gyn Exam.  +, NO HRT.  S/p tubal ligation.        HPI:   58 y.o. WF   (No HRT) s/p Tubal,  presents for an annual gyn exam.  Denies complaints today.    Raising 4 grandchildren, ages 9-17.      FmHx: +colon cancer in mother and maternal cousin, + breast cancer in [place], negative for breast, uterine, ovarian, and colon cancers.     Labs / Significant Studies:  MENOPAUSAL:  Age at menarche: 16  Age at menopause: 50  Hysterectomy:  No  Last pap: 2022 - Neg w/ Neg HPV, Neg Gc/Ct/Tv  H/o Abnormal Pap: Yes   Colposcopy: Yes  Postcoital Bleeding: N/A  Sexually Active: Not currently   Dyspareunia: No  H/o STI: CZ  HRT: No   MM2022 - Benign  H/o abnl MMG: None   Colonoscopy:  - Normal per pt.    Family History   Problem Relation Age of Onset    Colon cancer Mother         onset unknown    No Known Problems Brother     No Known Problems Sister     Breast cancer Maternal Aunt         onset unknown    Colon cancer Maternal Cousin 59    Ovarian cancer Neg Hx     Uterine cancer Neg Hx     Cervical cancer Neg Hx          Past Medical History:   Diagnosis Date    Anxiety and depression     Hyperlipidemia     Hypertension     Insomnia     Known health problems: none     Unspecified osteoarthritis, unspecified site     bilateral knees     Past Surgical History:   Procedure Laterality Date    CARPAL TUNNEL RELEASE       SECTION  1986     SECTION  1988     SECTION  1992     SECTION WITH TUBAL LIGATION  1990       Current Outpatient Medications:     atorvastatin (LIPITOR) 10 MG tablet, Take 10 mg by mouth., Disp: , Rfl:     DULoxetine (CYMBALTA) 60 MG capsule, Take 60 mg by mouth., Disp: , Rfl:     lisinopriL (PRINIVIL,ZESTRIL) 5 MG tablet, , Disp: , Rfl:     metoprolol succinate (TOPROL-XL) 25 MG 24 hr tablet, Take 25 mg by mouth., Disp: , Rfl:      QUEtiapine (SEROQUEL) 400 MG tablet, Take 400 mg by mouth once daily., Disp: , Rfl:     Current Facility-Administered Medications:     LIDOcaine (PF) 10 mg/ml (1%) injection 50 mg, 5 mL, Other, 1 time in Clinic/[facility], Elle Garber NP    LIDOcaine (PF) 10 mg/ml (1%) injection 50 mg, 5 mL, Other, 1 time in Clinic/[facility], Elle Garber NP    triamcinolone acetonide injection 40 mg, 40 mg, Intra-articular, 1 time in Clinic/[facility], Elle Garber NP    triamcinolone acetonide injection 40 mg, 40 mg, Intra-articular, 1 time in [facility], Elle Garber NP    Review of patient's allergies indicates:   Allergen Reactions    Ibuprofen Other (See Comments)     Other reaction(s): Abdominal Pain, Stomach ulcer    Sulfamethoxazole-trimethoprim Nausea And Vomiting       Social History     Tobacco Use    Smoking status: Former     Current packs/day: 0.00     Average packs/day: 1 pack/day for 39.9 years (39.9 ttl pk-yrs)     Types: Cigarettes     Start date:      Quit date: 2021     Years since quittin.6     Passive exposure: Never    Smokeless tobacco: Never   Substance Use Topics    Alcohol use: Never    Drug use: Never       Review of Systems:  General/Constitutional: Chills denies. Fatigue/weakness denies. Fever denies. Night sweats denies. Hot flashes denies    Respiratory: Cough denies. Hemoptysis denies. SOB denies. Sputum production denies. Wheezing denies .   Cardiovascular: Chest pain denies . Dizziness denies. Palpitations denies. Swelling in hands/feet denies    Gastrointestinal: Abdominal pain denies. Blood in stool denies. Constipation denies. Diarrhea denies. Heartburn denies. Nausea denies. Vomiting denies    Genitourinary: Incontinence denies. Blood in urine denies. Frequent urination denies. Painful urination denies. Urinary urgency denies. Nocturia denies    Gynecologic: Irregular menses denies. Heavy bleeding denies. Painful menses denies. Vaginal discharge denies. Vaginal  odor denies. Vaginal itching denies. Vaginal lesion denies. Pelvic pain denies. Decreased libido denies. Vulvar lesion denies. Prolapse of genital organs denies. Painful intercourse denies. Postcoital bleeding denies    Psychiatric: Depression denies. Anxiety denies     Physical Exam:   Vitals:    08/09/23 1403   BP: 116/64   BP Location: Left arm   Patient Position: Sitting   BP Method: Medium (Manual)   Temp: 97.7 °F (36.5 °C)   TempSrc: Temporal   Weight: 57.1 kg (125 lb 12.4 oz)   Height: 5' (1.524 m)       Body mass index is 24.56 kg/m².       Chaperone: present.     General appearance: healthy, well-nourished and well-developed     Psychiatric: Orientation to time, place and person. Normal mood and affect and active, alert     Skin: Appearance: no rashes or lesions.     Neck:   Neck: supple, FROM, trachea midline. and no masses   Thyroid: no enlargement or nodules and non-tender.       Cardiovascular:   Auscultation: RRR and no murmur.   Peripheral Vascular: no varicosities, LLE edema, RLE edema, calf tenderness, and palpable cord and pedal pulses intact.     Lungs:   Respiratory effort: no intercostal retractions or accessory muscle usage.   Auscultation: no wheezing, rales/crackles, or rhonchi and clear to auscultation.     Breast:   Inspection/Palpation: no tenderness, discrete/distinct masses, skin changes, or abnormal secretions. Nipple appearance normal.     Abdomen:   Auscultation/Inspection/Palpation: no hepatomegaly, splenomegaly, masses, tenderness or CVA tenderness and soft, non-distended bowel sounds preset.    Hernia: no palpable hernias.     Female Genitalia:    Vulva: no masses, tenderness or lesions    Bladder/Urethra: no urethral discharge or mass, normal meatus, bladder non-distended.    Vagina: no tenderness, erythema, cystocele, rectocele, abnormal vaginal discharge or vesicle(s) or ulcers    Cervix: no discharge, no cervical lacerations noted or motion tenderness and grossly normal     Uterus: normal size and shape and midline, non-tender, and no uterine prolapse.    Adnexa/Parametria: no parametrial tenderness or mass, no adnexal tenderness or ovarian mass.     Lymph Nodes:   Palpation: non tender submandibular nodes, axillary nodes, or inguinal nodes.     Rectal Exam:   Rectum: normal perianal skin.       Assessment:     Patient Active Problem List   Diagnosis    Primary osteoarthritis of both knees    BMI 25.0-25.9,adult       Health Maintenance Due   Topic Date Due    Hepatitis C Screening  Never done    Lipid Panel  Never done    HIV Screening  Never done    TETANUS VACCINE  Never done    Colorectal Cancer Screening  Never done    Shingles Vaccine (1 of 2) Never done    Mammogram  05/13/2023     Health Maintenance Topics with due status: Not Due       Topic Last Completion Date    Cervical Cancer Screening 08/05/2022    Influenza Vaccine 09/20/2022    LDCT Lung Screen 10/04/2022         Plan:    Roya was seen today for well woman.    Diagnoses and all orders for this visit:    Abnormal gynecological examination  PAP HPV GC/CZ/TV  Reviewed calcium needs, exercise, and prevention of osteoporosis.  Healthy diet  Annual MMG  Discussed breast self-awareness  Colonoscopy q 10 yrs  Reviewed normal menopause and menopausal symptoms  RTC 1 yr    Atrophic vaginitis  Premarin Vaginal Cream sent to pharmacy.     0.5 vaginally qHS x14 days, then 2x a week after.     Family history of breast cancer  - Discussed recommendations of annual screening after age of 40 with mammogram and MRI for patients with lifetime risk greater than 25%     - Explained that screening is not 100% reliable.  Advised patient if she notices any changes to her breast including a lump, mass, dimpling, discharge, rash, or tenderness she should contact us immediately.     - Recommend monthly BSE     Encounter for screening mammogram for malignant neoplasm of breast  -     Mammo Digital Screening Bilat w/ [facility]; Future    Cervical  cancer screening    Family history of colon cancer  - Educated    - Advised pt to continue with Colonoscopy per PCP.     Screening for STD (sexually transmitted disease)    S/P tubal ligation

## 2023-08-07 ENCOUNTER — OFFICE VISIT (OUTPATIENT)
Dept: ORTHOPEDICS | Facility: CLINIC | Age: 58
End: 2023-08-07
Payer: MEDICAID

## 2023-08-07 VITALS — WEIGHT: 128 LBS | HEIGHT: 60 IN | BODY MASS INDEX: 25.13 KG/M2

## 2023-08-07 DIAGNOSIS — M17.0 PRIMARY OSTEOARTHRITIS OF BOTH KNEES: Primary | ICD-10-CM

## 2023-08-07 PROCEDURE — 99442 PR PHYSICIAN TELEPHONE EVALUATION 11-20 MIN: CPT | Mod: 95,,, | Performed by: NURSE PRACTITIONER

## 2023-08-07 PROCEDURE — 4010F ACE/ARB THERAPY RXD/TAKEN: CPT | Mod: CPTII,95,, | Performed by: NURSE PRACTITIONER

## 2023-08-07 PROCEDURE — 3008F BODY MASS INDEX DOCD: CPT | Mod: CPTII,95,, | Performed by: NURSE PRACTITIONER

## 2023-08-07 PROCEDURE — 1160F PR REVIEW ALL MEDS BY PRESCRIBER/CLIN PHARMACIST DOCUMENTED: ICD-10-PCS | Mod: CPTII,95,, | Performed by: NURSE PRACTITIONER

## 2023-08-07 PROCEDURE — 1159F MED LIST DOCD IN RCRD: CPT | Mod: CPTII,95,, | Performed by: NURSE PRACTITIONER

## 2023-08-07 PROCEDURE — 3008F PR BODY MASS INDEX (BMI) DOCUMENTED: ICD-10-PCS | Mod: CPTII,95,, | Performed by: NURSE PRACTITIONER

## 2023-08-07 PROCEDURE — 4010F PR ACE/ARB THEARPY RXD/TAKEN: ICD-10-PCS | Mod: CPTII,95,, | Performed by: NURSE PRACTITIONER

## 2023-08-07 PROCEDURE — 1160F RVW MEDS BY RX/DR IN RCRD: CPT | Mod: CPTII,95,, | Performed by: NURSE PRACTITIONER

## 2023-08-07 PROCEDURE — 1159F PR MEDICATION LIST DOCUMENTED IN MEDICAL RECORD: ICD-10-PCS | Mod: CPTII,95,, | Performed by: NURSE PRACTITIONER

## 2023-08-07 PROCEDURE — 99442 PR PHYSICIAN TELEPHONE EVALUATION 11-20 MIN: ICD-10-PCS | Mod: 95,,, | Performed by: NURSE PRACTITIONER

## 2023-08-07 NOTE — PROGRESS NOTES
"Telemedicine Consent  The patient location is: Home  The chief complaint leading to consultation is: bilateral knee pain  Visit type: Audio only.  Attempt made for video TM visit but was unsuccessful due to technical issues.  30 minutes of total time spent on the encounter, which includes face-to-face time and non-face-to-face time preparing to see the patient (eg. review of tests), obtaining and/or reviewing separately obtained history, documenting clinical information in the electronic or other health record, independently interpreting results (not separately reported) and communicating results to the patient/family/caregiver or care coordination (not separately reported).   I have reviewed patient's name,  and picture ID if applicable.  I discussed with patient regarding medical services by telemedicine (1) informed of the relationship between the physician and patient and the respective role of any other health care provider with respect to management of the patient (2) session are not recorded and personal health information is protected; and (3) notified that he or she may decline to receive medical services by telemedicine and may withdraw from such care at any time.  Patient consented to consult by telemedicine.   Subjective:   PATIENT ID: Roya Wills is a 58 y.o. female. Smoker. Employment HX: , currently employed.    Seen [facility] for same DX since .   CHIEF COMPLAINT: Pain of the Left Knee, Pain of the Right Knee, and Knee Pain (MRI Results RT. Knee)    HPI:    Bilateral L > R aching medial knee pain.   Injury: no known injury  Onset: several years ago fluctuates   Modifying Factors: worse with activity, improves with rest, stiffness after immobilization, and improves with less than 30 minutes activity  Associated Symptoms: crepitus, decreased ROM, swelling, and "giving out"   Activity: sedentary with light activity and pain moderately interferes with ADLs   Previous Treatments:  BMI " "reduction ongoing education, HEP withTheraBand, RX NSAIDs, RX PT completed 12 wks/ 2 xs per week d/c'd 2022, and CSI since  last injection 22  with some relief but symptoms returning  PMH: + GI bleeding  Family History: + OA    NOTE: Follow up for MRI results.  CSI given 22 with minimal relief, lasting 8-10 weeks.  Symptoms returning and continues affecting ADLs.  Requesting to try VS injections.    Current Outpatient Medications:     diclofenac sodium (VOLTAREN) 1 % Gel, SMARTSI Gram(s) Topical 2-3 Times Daily PRN, Disp: , Rfl:     dicyclomine (BENTYL) 20 mg tablet, Take 1 tablet (20 mg total) by mouth 3 (three) times daily as needed (for cramping)., Disp: 20 tablet, Rfl: 0    gabapentin (NEURONTIN) 800 MG tablet, Take 800 mg by mouth every evening., Disp: , Rfl:     lisinopriL (PRINIVIL,ZESTRIL) 5 MG tablet, , Disp: , Rfl:     meloxicam (MOBIC) 15 MG tablet, , Disp: , Rfl:     ondansetron (ZOFRAN-ODT) 4 MG TbDL, Take 1 tablet (4 mg total) by mouth every 6 (six) hours as needed (nausea)., Disp: 12 tablet, Rfl: 0    QUEtiapine (SEROQUEL) 400 MG tablet, Take 400 mg by mouth once daily., Disp: , Rfl:     Current Facility-Administered Medications:     LIDOcaine (PF) 10 mg/ml (1%) injection 50 mg, 5 mL, Other, 1 time in [facility], Elle Garber NP    LIDOcaine (PF) 10 mg/ml (1%) injection 50 mg, 5 mL, Other, 1 time in [facility], Elle Garber NP    triamcinolone acetonide injection 40 mg, 40 mg, Intra-articular, 1 time in [facility], Elle Garber NP    triamcinolone acetonide injection 40 mg, 40 mg, Intra-articular, 1 time in [facility], Elle Garber NP  Review of patient's allergies indicates:   Allergen Reactions    Ibuprofen      Other reaction(s): Abdominal Pain, Stomach ulcer    Sulfamethoxazole-trimethoprim Nausea And Vomiting     No results found for: "HGBA1C"   Body mass index is 25 kg/m².   Vitals:    23 1024   Weight: 58.1 kg (128 lb)   Height: 5' " (1.524 m)   PainSc:   6      REVIEW OF SYSTEMS:  A ten-point review of systems was performed and is negative, except as mentioned above   Objective:   N/a telemedicine visit  Assessment:   IMAGING: X-ray 4 views of right knee dated 6/2/23 reviewed and independently interpreted by me.  Discussed with patient. Noted no acute, DJD noted.    XR KNEE COMP 4 OR MORE VIEWS LEFT 6/2/23  CLINICAL HISTORY:  Bilateral primary osteoarthritis of knee  FINDINGS:  There is some narrowing of the medial compartment of the knee joint articular spaces are otherwise preserved with some narrowing of the medial compartment of the opposite knee as seen on the standing projection no acute fractures or dislocations are otherwise identified  Impression:  Degenerative changes    EXAMINATION STUDY: MRI KNEE WITHOUT CONTRAST RIGHT 6/29/23  CLINICAL HX: -OP- FALL FROM TRAMPOLINE 1.5 YRS AGO, PAIN SINCE  PMH: OSTEOARTHRITIS  COMPARISON:  Plain films of the right knee dated 06/02/2023  FINDINGS:  Multiplanar imaging using multiple sequences was performed. I see no evidence of fractures or dislocations. No worrisome, abnormal signal intensity is noted within the skeletal structures. The quadriceps and patellar tendons appear intact and unremarkable. The ACL and PCL appear intact and unremarkable. The medial and lateral collateral ligamentous complexes appear intact and unremarkable. Moderate degenerative changes are noted involving the posterior horn of the medial meniscus with no high grade meniscal tears appreciated.  The lateral meniscus appears intact and unremarkable. No obvious muscle tears or abnormalities involving the adjacent soft tissue structures is noted.   A mild-to-moderate synovial effusion extends into the suprapatellar bursa.  Impression:  1. Moderate degenerative changes are noted involving the posterior horn of the medial meniscus with no high grade underlying tears appreciated.  2. A mild-to-moderate synovial effusion extends  into the suprapatellar bursa.    EMR REVIEW: completed with noted prior visit 6/2/23 reviewed.    DIAGNOSIS:  1. Primary osteoarthritis of both knees    2. BMI 25.0-25.9,adult      Plan:     Ongoing education about DX and treatment recommendations including conservative treatments of daily HEP with TheraBand, BMI reduction goal 5-10% of body weight (125# overall goal), muscle strengthening with use of stationary bike (RPM set at 80 or > with slow progression to goal of 40 minutes 3-4 times per week as tolerated), adequate vit D/C, glucosamine 1500 mg/day and daily acetaminophen 1000 mg 3 times/ day if able to tolerate.    Treatment plan: Moderate exacerbation of chronic Bilateral L < R Moderate knee arthritis Patient requesting to try VS injection for Bilateral  knee arthritis with failed conservative treatments including: RX NSAID, formal RX PT, CSI, and HEP > 3 months.    Will seek PA from insurance.   MRI results reviewed and discussed with patient.  Patient educated on findings.  Declines surgical options at this time due to transportation.  Requesting to try VS injections.     Procedure: CSI v. VS injections discussed, s/t failed conservative treatments patient consents to VS today  RX Medications: continue medications as RX per PCP.  RTC: next available procedure only visit   NOTE: Conversation had with patient discussing surgical versus conservative treatment options.  At this time patient declines referral to surgeon for further eval. and surgical treatment options.  If status changes patient will up date me.     Elle Garber FNP  Procedure Note:   None    Total Time Spent with Patient: 30 minutes  Visit Start Time: 1315  5 minutes spent prior to visit reviewing EMR, prior labs and x-rays.  15 minutes spent in audio only visit with patient for medical discussion, obtaining history, educating on DX and treatment plan.  10 minutes spent after visit completing EMR documentation.   Visit End Time: 1345

## 2023-08-09 ENCOUNTER — OFFICE VISIT (OUTPATIENT)
Dept: OBSTETRICS AND GYNECOLOGY | Facility: CLINIC | Age: 58
End: 2023-08-09
Payer: MEDICAID

## 2023-08-09 VITALS
SYSTOLIC BLOOD PRESSURE: 116 MMHG | DIASTOLIC BLOOD PRESSURE: 64 MMHG | TEMPERATURE: 98 F | BODY MASS INDEX: 24.69 KG/M2 | WEIGHT: 125.75 LBS | HEIGHT: 60 IN

## 2023-08-09 DIAGNOSIS — Z80.0 FAMILY HISTORY OF COLON CANCER: ICD-10-CM

## 2023-08-09 DIAGNOSIS — Z98.51 S/P TUBAL LIGATION: ICD-10-CM

## 2023-08-09 DIAGNOSIS — Z80.3 FAMILY HISTORY OF BREAST CANCER: ICD-10-CM

## 2023-08-09 DIAGNOSIS — Z01.411 ABNORMAL GYNECOLOGICAL EXAMINATION: Primary | ICD-10-CM

## 2023-08-09 DIAGNOSIS — Z12.4 CERVICAL CANCER SCREENING: ICD-10-CM

## 2023-08-09 DIAGNOSIS — Z11.3 SCREENING FOR STD (SEXUALLY TRANSMITTED DISEASE): ICD-10-CM

## 2023-08-09 DIAGNOSIS — Z12.31 ENCOUNTER FOR SCREENING MAMMOGRAM FOR MALIGNANT NEOPLASM OF BREAST: ICD-10-CM

## 2023-08-09 DIAGNOSIS — N95.2 ATROPHIC VAGINITIS: ICD-10-CM

## 2023-08-09 PROCEDURE — 1159F PR MEDICATION LIST DOCUMENTED IN MEDICAL RECORD: ICD-10-PCS | Mod: CPTII,,, | Performed by: NURSE PRACTITIONER

## 2023-08-09 PROCEDURE — 3074F PR MOST RECENT SYSTOLIC BLOOD PRESSURE < 130 MM HG: ICD-10-PCS | Mod: CPTII,,, | Performed by: NURSE PRACTITIONER

## 2023-08-09 PROCEDURE — 99396 PR PREVENTIVE VISIT,EST,40-64: ICD-10-PCS | Mod: ,,, | Performed by: NURSE PRACTITIONER

## 2023-08-09 PROCEDURE — 3078F DIAST BP <80 MM HG: CPT | Mod: CPTII,,, | Performed by: NURSE PRACTITIONER

## 2023-08-09 PROCEDURE — 3074F SYST BP LT 130 MM HG: CPT | Mod: CPTII,,, | Performed by: NURSE PRACTITIONER

## 2023-08-09 PROCEDURE — 88174 CYTOPATH C/V AUTO IN FLUID: CPT | Performed by: NURSE PRACTITIONER

## 2023-08-09 PROCEDURE — 1160F RVW MEDS BY RX/DR IN RCRD: CPT | Mod: CPTII,,, | Performed by: NURSE PRACTITIONER

## 2023-08-09 PROCEDURE — 3008F PR BODY MASS INDEX (BMI) DOCUMENTED: ICD-10-PCS | Mod: CPTII,,, | Performed by: NURSE PRACTITIONER

## 2023-08-09 PROCEDURE — 1160F PR REVIEW ALL MEDS BY PRESCRIBER/CLIN PHARMACIST DOCUMENTED: ICD-10-PCS | Mod: CPTII,,, | Performed by: NURSE PRACTITIONER

## 2023-08-09 PROCEDURE — 87624 HPV HI-RISK TYP POOLED RSLT: CPT

## 2023-08-09 PROCEDURE — 99396 PREV VISIT EST AGE 40-64: CPT | Mod: ,,, | Performed by: NURSE PRACTITIONER

## 2023-08-09 PROCEDURE — 3078F PR MOST RECENT DIASTOLIC BLOOD PRESSURE < 80 MM HG: ICD-10-PCS | Mod: CPTII,,, | Performed by: NURSE PRACTITIONER

## 2023-08-09 PROCEDURE — 4010F PR ACE/ARB THEARPY RXD/TAKEN: ICD-10-PCS | Mod: CPTII,,, | Performed by: NURSE PRACTITIONER

## 2023-08-09 PROCEDURE — 1159F MED LIST DOCD IN RCRD: CPT | Mod: CPTII,,, | Performed by: NURSE PRACTITIONER

## 2023-08-09 PROCEDURE — 4010F ACE/ARB THERAPY RXD/TAKEN: CPT | Mod: CPTII,,, | Performed by: NURSE PRACTITIONER

## 2023-08-09 PROCEDURE — 3008F BODY MASS INDEX DOCD: CPT | Mod: CPTII,,, | Performed by: NURSE PRACTITIONER

## 2023-08-09 RX ORDER — DULOXETIN HYDROCHLORIDE 60 MG/1
60 CAPSULE, DELAYED RELEASE ORAL
COMMUNITY
Start: 2023-08-08

## 2023-08-09 RX ORDER — ATORVASTATIN CALCIUM 10 MG/1
10 TABLET, FILM COATED ORAL
COMMUNITY
Start: 2023-08-05

## 2023-08-09 RX ORDER — METOPROLOL SUCCINATE 25 MG/1
25 TABLET, EXTENDED RELEASE ORAL
COMMUNITY
Start: 2023-08-01

## 2023-08-15 LAB — PSYCHE PATHOLOGY RESULT: NORMAL

## 2023-08-18 ENCOUNTER — PROCEDURE VISIT (OUTPATIENT)
Dept: ORTHOPEDICS | Facility: CLINIC | Age: 58
End: 2023-08-18
Payer: MEDICAID

## 2023-08-18 DIAGNOSIS — M17.0 PRIMARY OSTEOARTHRITIS OF BOTH KNEES: Primary | ICD-10-CM

## 2023-08-18 PROCEDURE — 99499 UNLISTED E&M SERVICE: CPT | Mod: S$PBB,,, | Performed by: NURSE PRACTITIONER

## 2023-08-18 PROCEDURE — 99499 NO LOS: ICD-10-PCS | Mod: S$PBB,,, | Performed by: NURSE PRACTITIONER

## 2023-08-18 PROCEDURE — 20610 DRAIN/INJ JOINT/BURSA W/O US: CPT | Mod: 50,PBBFAC | Performed by: NURSE PRACTITIONER

## 2023-08-18 PROCEDURE — 20610 LARGE JOINT ASPIRATION/INJECTION: BILATERAL KNEE: ICD-10-PCS | Mod: 50,S$PBB,, | Performed by: NURSE PRACTITIONER

## 2023-08-18 PROCEDURE — 20610 DRAIN/INJ JOINT/BURSA W/O US: CPT | Mod: PBBFAC,50 | Performed by: NURSE PRACTITIONER

## 2023-08-18 RX ADMIN — LIDOCAINE HYDROCHLORIDE 5 ML: 10 INJECTION, SOLUTION EPIDURAL; INFILTRATION; INTRACAUDAL; PERINEURAL at 09:08

## 2023-08-18 RX ADMIN — Medication 30 MG: at 09:08

## 2023-08-18 NOTE — PROCEDURES
Large Joint Aspiration/Injection: bilateral knee    Date/Time: 8/18/2023 9:50 AM    Performed by: Elle Garber NP  Authorized by: Elle Garber NP    Location:  Knee  Laterality:  Bilateral  Site:  Bilateral knee  Medications (Right):  5 mL LIDOCAINE 1% (PF) 50 mg / 5 mL; 30 mg ORTHOVISC 30 mg / 2 mL  Medications (Left):  5 mL LIDOCAINE 1% (PF) 50 mg / 5 mL; 30 mg ORTHOVISC 30 mg / 2 mL     Ortho Procedure Note   Procedure: BILATERAL Knee Orthovisc # 1 lateral suprapatellar approach     Indications: Therapeutic Indication - Decrease pain, Increase range of motion and improve quality of life:   Risks:  Possible complications with the injection include bleeding, infection (.01%), tendon rupture, fat pad or soft tissue atrophy, skin depigmentation, allergic reaction to medications and vasovagal response.   Consent:  Procedure, risks, benefits, and alternatives explained the patient, who voiced understanding and agreed to proceed with procedure.  Consent signed and scanned into the medical record.   No absolute contraindications (cellulitis overlying joint, infection, lack of informed consent, allergy to injection medication, AVN protein or egg allergy for sodium hyaluronate) or relative contraindications ( coagulopathy, INR > 3.5, previous joint replacement or history of AVN).        Staff: Elle Garber FNP  Description:  Time-out performed.  The patient was prepped in normal sterile fashion use of chlorhexidine scrub and the appropriate and anatomic landmarks were identified.  Sterile 21 gauge 1.5 inch  was used. Topical ethyl chloride was applied. Contents of syringe included:     RIGHT KNEE: 30 mg / 2 ml of Orthovisc injected.    LEFT KNEE: 30 mg / 2 ml of Orthovisc injected.    Drainage: n/a  Complications: None   EBL: None   Post Procedure: Patient alert, and moving all extremities. ROM improved, pain decreased.  Good peripheral pulses, no signs of vascular compromise and range of motion  intact.  Aftercare instructions were given to patient at time of discharge.  Relative rest for 3 days-avoiding excess activity.  Place ice on the area for 15 minutes every 4-6 hours. Patient may take Tylenol a 1000 mg b.i.d. or ibuprofen 600 mg t.i.d. for the next 3-4 days if not on medication already and safe to take pending co-morbidities.  Protect the area for the next 1-8 hours if anesthetic was used.  Avoid excessive activity for the next 3-4 weeks.  ER precautions given for fever, severe joint pain or allergic reaction or other new symptoms related to the joint injection.

## 2023-09-01 ENCOUNTER — PROCEDURE VISIT (OUTPATIENT)
Dept: ORTHOPEDICS | Facility: CLINIC | Age: 58
End: 2023-09-01
Payer: MEDICAID

## 2023-09-01 DIAGNOSIS — M17.0 PRIMARY OSTEOARTHRITIS OF BOTH KNEES: Primary | ICD-10-CM

## 2023-09-01 PROCEDURE — 99499 NO LOS: ICD-10-PCS | Mod: S$PBB,,, | Performed by: NURSE PRACTITIONER

## 2023-09-01 PROCEDURE — 20610 DRAIN/INJ JOINT/BURSA W/O US: CPT | Mod: 50,S$PBB,, | Performed by: NURSE PRACTITIONER

## 2023-09-01 PROCEDURE — 20610 DRAIN/INJ JOINT/BURSA W/O US: CPT | Mod: PBBFAC,50 | Performed by: NURSE PRACTITIONER

## 2023-09-01 PROCEDURE — 20610 PR DRAIN/INJECT LARGE JOINT/BURSA: ICD-10-PCS | Mod: 50,S$PBB,, | Performed by: NURSE PRACTITIONER

## 2023-09-01 PROCEDURE — 20610 DRAIN/INJ JOINT/BURSA W/O US: CPT | Mod: 50,PBBFAC | Performed by: NURSE PRACTITIONER

## 2023-09-01 PROCEDURE — 99499 UNLISTED E&M SERVICE: CPT | Mod: S$PBB,,, | Performed by: NURSE PRACTITIONER

## 2023-09-01 RX ADMIN — Medication 30 MG: at 08:09

## 2023-09-01 NOTE — PROCEDURES
Large Joint Aspiration/Injection: bilateral knee    Date/Time: 9/1/2023 8:30 AM    Performed by: Elle Garber NP  Authorized by: Elle Garber NP    Location:  Knee  Laterality:  Bilateral  Site:  Bilateral knee  Medications (Right):  30 mg ORTHOVISC 30 mg / 2 mL  Medications (Left):  30 mg ORTHOVISC 30 mg / 2 mL     Ortho Procedure Note   Procedure: BILATERAL Knee Orthovisc # 2 lateral suprapatellar approach     Indications: Therapeutic Indication - Decrease pain, Increase range of motion and improve quality of life:   Risks:  Possible complications with the injection include bleeding, infection (.01%), tendon rupture, fat pad or soft tissue atrophy, skin depigmentation, allergic reaction to medications and vasovagal response.   Consent:  Procedure, risks, benefits, and alternatives explained the patient, who voiced understanding and agreed to proceed with procedure.  Consent signed and scanned into the medical record.   No absolute contraindications (cellulitis overlying joint, infection, lack of informed consent, allergy to injection medication, AVN protein or egg allergy for sodium hyaluronate) or relative contraindications ( coagulopathy, INR > 3.5, previous joint replacement or history of AVN).        Staff: Elle Garber FNP  Description:  Time-out performed.  The patient was prepped in normal sterile fashion use of chlorhexidine scrub and the appropriate and anatomic landmarks were identified.  Sterile 21 gauge 1.5 inch  was used. Topical ethyl chloride was applied. Contents of syringe included:     RIGHT KNEE: 30 mg / 2 ml of Orthovisc injected.    LEFT KNEE: 30 mg / 2 ml of Orthovisc injected.    Drainage: n/a  Complications: None   EBL: None   Post Procedure: Patient alert, and moving all extremities. ROM improved, pain decreased.  Good peripheral pulses, no signs of vascular compromise and range of motion intact.  Aftercare instructions were given to patient at time of discharge.   Relative rest for 3 days-avoiding excess activity.  Place ice on the area for 15 minutes every 4-6 hours. Patient may take Tylenol a 1000 mg b.i.d. or ibuprofen 600 mg t.i.d. for the next 3-4 days if not on medication already and safe to take pending co-morbidities.  Protect the area for the next 1-8 hours if anesthetic was used.  Avoid excessive activity for the next 3-4 weeks.  ER precautions given for fever, severe joint pain or allergic reaction or other new symptoms related to the joint injection.

## 2023-09-08 ENCOUNTER — PROCEDURE VISIT (OUTPATIENT)
Dept: ORTHOPEDICS | Facility: CLINIC | Age: 58
End: 2023-09-08
Payer: MEDICAID

## 2023-09-08 DIAGNOSIS — M17.0 PRIMARY OSTEOARTHRITIS OF BOTH KNEES: Primary | ICD-10-CM

## 2023-09-08 PROCEDURE — 20610 PR DRAIN/INJECT LARGE JOINT/BURSA: ICD-10-PCS | Mod: 50,S$PBB,, | Performed by: NURSE PRACTITIONER

## 2023-09-08 PROCEDURE — 20610 DRAIN/INJ JOINT/BURSA W/O US: CPT | Mod: PBBFAC,50 | Performed by: NURSE PRACTITIONER

## 2023-09-08 PROCEDURE — 99499 UNLISTED E&M SERVICE: CPT | Mod: S$PBB,,, | Performed by: NURSE PRACTITIONER

## 2023-09-08 PROCEDURE — 20610 DRAIN/INJ JOINT/BURSA W/O US: CPT | Mod: 50,PBBFAC | Performed by: NURSE PRACTITIONER

## 2023-09-08 PROCEDURE — 99499 NO LOS: ICD-10-PCS | Mod: S$PBB,,, | Performed by: NURSE PRACTITIONER

## 2023-09-08 PROCEDURE — 20610 DRAIN/INJ JOINT/BURSA W/O US: CPT | Mod: 50,S$PBB,, | Performed by: NURSE PRACTITIONER

## 2023-09-08 RX ADMIN — Medication 30 MG: at 08:09

## 2023-09-08 NOTE — PROCEDURES
Large Joint Aspiration/Injection: bilateral knee    Date/Time: 9/8/2023 8:50 AM    Performed by: Elle Garber NP  Authorized by: Elle Garber NP    Location:  Knee  Laterality:  Bilateral  Site:  Bilateral knee  Medications (Right):  30 mg ORTHOVISC 30 mg / 2 mL  Medications (Left):  30 mg ORTHOVISC 30 mg / 2 mL     Ortho Procedure Note   Procedure: BILATERAL Knee Orthovisc # 3 lateral suprapatellar approach     Indications: Therapeutic Indication - Decrease pain, Increase range of motion and improve quality of life:   Risks:  Possible complications with the injection include bleeding, infection (.01%), tendon rupture, fat pad or soft tissue atrophy, skin depigmentation, allergic reaction to medications and vasovagal response.   Consent:  Procedure, risks, benefits, and alternatives explained the patient, who voiced understanding and agreed to proceed with procedure.  Consent signed and scanned into the medical record.   No absolute contraindications (cellulitis overlying joint, infection, lack of informed consent, allergy to injection medication, AVN protein or egg allergy for sodium hyaluronate) or relative contraindications ( coagulopathy, INR > 3.5, previous joint replacement or history of AVN).        Staff: Elle Garber FNP  Description:  Time-out performed.  The patient was prepped in normal sterile fashion use of chlorhexidine scrub and the appropriate and anatomic landmarks were identified.  Sterile 21 gauge 1.5 inch  was used. Topical ethyl chloride was applied. Contents of syringe included:     RIGHT KNEE: 30 mg / 2 ml of Orthovisc injected.    LEFT KNEE: 30 mg / 2 ml of Orthovisc injected.    Drainage: n/a  Complications: None   EBL: None   Post Procedure: Patient alert, and moving all extremities. ROM improved, pain decreased.  Good peripheral pulses, no signs of vascular compromise and range of motion intact.  Aftercare instructions were given to patient at time of discharge.   Relative rest for 3 days-avoiding excess activity.  Place ice on the area for 15 minutes every 4-6 hours. Patient may take Tylenol a 1000 mg b.i.d. or ibuprofen 600 mg t.i.d. for the next 3-4 days if not on medication already and safe to take pending co-morbidities.  Protect the area for the next 1-8 hours if anesthetic was used.  Avoid excessive activity for the next 3-4 weeks.  ER precautions given for fever, severe joint pain or allergic reaction or other new symptoms related to the joint injection.

## 2023-09-20 ENCOUNTER — HOSPITAL ENCOUNTER (EMERGENCY)
Facility: HOSPITAL | Age: 58
Discharge: HOME | End: 2023-09-20
Payer: MEDICAID

## 2023-09-20 VITALS
WEIGHT: 129 LBS | RESPIRATION RATE: 18 BRPM | SYSTOLIC BLOOD PRESSURE: 117 MMHG | TEMPERATURE: 99 F | DIASTOLIC BLOOD PRESSURE: 74 MMHG | BODY MASS INDEX: 25.32 KG/M2 | HEART RATE: 102 BPM | OXYGEN SATURATION: 99 % | HEIGHT: 60 IN

## 2023-09-20 DIAGNOSIS — R52 PAIN: Primary | ICD-10-CM

## 2023-09-20 DIAGNOSIS — S63.501A SPRAIN OF RIGHT WRIST, INITIAL ENCOUNTER: ICD-10-CM

## 2023-09-20 PROCEDURE — 25000003 PHARM REV CODE 250: Performed by: NURSE PRACTITIONER

## 2023-09-20 PROCEDURE — 99283 EMERGENCY DEPT VISIT LOW MDM: CPT

## 2023-09-20 RX ORDER — HYDROCODONE BITARTRATE AND ACETAMINOPHEN 10; 325 MG/1; MG/1
1 TABLET ORAL
Status: COMPLETED | OUTPATIENT
Start: 2023-09-20 | End: 2023-09-20

## 2023-09-20 RX ADMIN — HYDROCODONE BITARTRATE AND ACETAMINOPHEN 1 TABLET: 10; 325 TABLET ORAL at 06:09

## 2023-09-20 NOTE — ED PROVIDER NOTES
Encounter Date: 2023       History     Chief Complaint   Patient presents with    Wrist Pain     PT STATES TRIPPING ON SANDAL LAST NIGHT AND LANDING ONTO HANDS. PT STATES INCREASE IN RIGHT WRIST/HAND PAIN. STATES HX OF CARPAL TUNNEL SX TO RIGHT WRIST. PT DENIES TAKING OTC MEDS.     C/o rt wrist pain since falling last night      Review of patient's allergies indicates:   Allergen Reactions    Ibuprofen Other (See Comments)     Other reaction(s): Abdominal Pain, Stomach ulcer    Sulfamethoxazole-trimethoprim Nausea And Vomiting     Past Medical History:   Diagnosis Date    Anxiety and depression     Hyperlipidemia     Hypertension     Insomnia     Known health problems: none     Unspecified osteoarthritis, unspecified site     bilateral knees     Past Surgical History:   Procedure Laterality Date    CARPAL TUNNEL RELEASE       SECTION  1986     SECTION  1988     SECTION  1992     SECTION WITH TUBAL LIGATION  1990     Family History   Problem Relation Age of Onset    Colon cancer Mother         onset unknown    No Known Problems Brother     No Known Problems Sister     Breast cancer Maternal Aunt         onset unknown    Colon cancer Maternal Cousin 59    Ovarian cancer Neg Hx     Uterine cancer Neg Hx     Cervical cancer Neg Hx      Social History     Tobacco Use    Smoking status: Former     Current packs/day: 0.00     Average packs/day: 1 pack/day for 39.9 years (39.9 ttl pk-yrs)     Types: Cigarettes     Start date:      Quit date: 2021     Years since quittin.8     Passive exposure: Never    Smokeless tobacco: Never   Substance Use Topics    Alcohol use: Never    Drug use: Never     Review of Systems   Musculoskeletal:         Rt wrist pain   All other systems reviewed and are negative.      Physical Exam     Initial Vitals [23 1732]   BP Pulse Resp Temp SpO2   117/74 102 18 98.7 °F (37.1 °C) 99 %      MAP       --         Physical  Exam    Nursing note and vitals reviewed.  Constitutional: She appears well-developed and well-nourished.   HENT:   Head: Normocephalic and atraumatic.   Eyes: Pupils are equal, round, and reactive to light.   Neck: Neck supple.   Normal range of motion.  Cardiovascular:  Normal rate, regular rhythm and normal heart sounds.           Pulmonary/Chest: Breath sounds normal.   Musculoskeletal:         General: Normal range of motion.      Cervical back: Normal range of motion and neck supple.      Comments: Rt wrist limited rom, pain with rom     Neurological: She is alert and oriented to person, place, and time.   Skin: Skin is warm and dry. Capillary refill takes less than 2 seconds.   Psychiatric: She has a normal mood and affect. Her behavior is normal. Judgment and thought content normal.         ED Course   Procedures  Labs Reviewed - No data to display       Imaging Results              X-Ray Hand 3 view Right (Final result)  Result time 09/20/23 18:16:11   Procedure changed from X-Ray Wrist Complete Right     Final result by Tamika Goodwin III, MD (09/20/23 18:16:11)                   Impression:      1. Chronic changes are present as described above.  See above comments.      Electronically signed by: Tamika Goodwin  Date:    09/20/2023  Time:    18:16               Narrative:    EXAMINATION:  STUDY: XR HAND COMPLETE 3 VIEW RIGHT    CLINICAL HISTORY AND TECHNIQUE:  Soraida Roy, RT on 9/20/2023  5:56 PMCURRENT CLINICAL HISTORY: ER PT.  PT REPORTS TRIP AND FALL LAST PM AND LANDING ON HANDS. PT REPORTS PAIN IN RT WRIST/HAND PAIN.  PMH: CARPAL TUNNEL SX TO RT WRIST. OSTEOARTHRITIS  TECHNIQUE: 3 VIEW RT HAND  TECHNOLOGIST:   TRANSPORT OK    COMPARISON:  None    FINDINGS:  Mild degenerative changes are noted involving the radiocarpal joint to a lesser extent the 1st carpometacarpal joint and interphalangeal joints.  I see no definite fractures, dislocations, or other significant abnormalities.                         Wet Read by Varun Cool MD (09/20/23 18:14:46, [facility])    No visible fracture, normal alignment                                     Medications   HYDROcodone-acetaminophen  mg per tablet 1 tablet (1 tablet Oral Given 9/20/23 3553)     Medical Decision Making  Problems Addressed:  Sprain of right wrist, initial encounter: acute illness or injury    Amount and/or Complexity of Data Reviewed  Radiology: ordered and independent interpretation performed.    Risk  Prescription drug management.                               Clinical Impression:   Final diagnoses:  [S63.501A] Sprain of right wrist, initial encounter        ED Disposition Condition    Discharge Stable          ED Prescriptions    None       Follow-up Information       Follow up With Specialties Details Why Contact Info    Amie Mckeon FNP-C Family Medicine Call  As needed [address]  [facility] [id number] [phone]               Smita Odonnell FNP  09/20/23 1964

## 2023-10-22 ENCOUNTER — HOSPITAL ENCOUNTER (INPATIENT)
Facility: HOSPITAL | Age: 58
LOS: 3 days | Discharge: HOME | DRG: 392 | End: 2023-10-25
Attending: FAMILY MEDICINE | Admitting: FAMILY MEDICINE
Payer: MEDICAID

## 2023-10-22 DIAGNOSIS — R11.2 INTRACTABLE NAUSEA AND VOMITING: Primary | ICD-10-CM

## 2023-10-22 PROBLEM — K52.9 COLITIS: Status: ACTIVE | Noted: 2023-10-22

## 2023-10-22 LAB
ALBUMIN SERPL-MCNC: 3.6 G/DL (ref 3.4–5)
ALBUMIN/GLOB SERPL: 1.2 RATIO
ALP SERPL-CCNC: 100 UNIT/L (ref 50–144)
ALT SERPL-CCNC: 26 UNIT/L (ref 1–45)
ANION GAP SERPL CALC-SCNC: 5 MEQ/L (ref 2–13)
APPEARANCE UR: CLEAR
AST SERPL-CCNC: 30 UNIT/L (ref 14–36)
BASOPHILS # BLD AUTO: 0.03 X10(3)/MCL (ref 0.01–0.08)
BASOPHILS NFR BLD AUTO: 0.5 % (ref 0.1–1.2)
BILIRUB SERPL-MCNC: 0.4 MG/DL (ref 0–1)
BILIRUB UR QL STRIP.AUTO: NEGATIVE
BUN SERPL-MCNC: 9 MG/DL (ref 7–20)
CALCIUM SERPL-MCNC: 8.9 MG/DL (ref 8.4–10.2)
CHLORIDE SERPL-SCNC: 109 MMOL/L (ref 98–110)
CO2 SERPL-SCNC: 25 MMOL/L (ref 21–32)
COLOR UR AUTO: YELLOW
CREAT SERPL-MCNC: 0.8 MG/DL (ref 0.66–1.25)
CREAT/UREA NIT SERPL: 11 (ref 12–20)
EOSINOPHIL # BLD AUTO: 0.14 X10(3)/MCL (ref 0.04–0.36)
EOSINOPHIL NFR BLD AUTO: 2.5 % (ref 0.7–7)
ERYTHROCYTE [DISTWIDTH] IN BLOOD BY AUTOMATED COUNT: 12.2 % (ref 11–14.5)
GFR SERPLBLD CREATININE-BSD FMLA CKD-EPI: 87 MLS/MIN/1.73/M2
GLOBULIN SER-MCNC: 3 GM/DL (ref 2–3.9)
GLUCOSE SERPL-MCNC: 87 MG/DL (ref 70–115)
GLUCOSE UR QL STRIP.AUTO: NEGATIVE
HCT VFR BLD AUTO: 39 % (ref 36–48)
HGB BLD-MCNC: 13 G/DL (ref 11.8–16)
IMM GRANULOCYTES # BLD AUTO: 0.02 X10(3)/MCL (ref 0–0.03)
IMM GRANULOCYTES NFR BLD AUTO: 0.4 % (ref 0–0.5)
KETONES UR QL STRIP.AUTO: NEGATIVE
LEUKOCYTE ESTERASE UR QL STRIP.AUTO: NEGATIVE
LIPASE SERPL-CCNC: 43 U/L (ref 23–300)
LYMPHOCYTES # BLD AUTO: 2.26 X10(3)/MCL (ref 1.16–3.74)
LYMPHOCYTES NFR BLD AUTO: 40.7 % (ref 20–55)
MCH RBC QN AUTO: 30.9 PG (ref 27–34)
MCHC RBC AUTO-ENTMCNC: 33.3 G/DL (ref 31–37)
MCV RBC AUTO: 92.6 FL (ref 79–99)
MONOCYTES # BLD AUTO: 0.44 X10(3)/MCL (ref 0.24–0.36)
MONOCYTES NFR BLD AUTO: 7.9 % (ref 4.7–12.5)
NEUTROPHILS # BLD AUTO: 2.66 X10(3)/MCL (ref 1.56–6.13)
NEUTROPHILS NFR BLD AUTO: 48 % (ref 37–73)
NITRITE UR QL STRIP.AUTO: NEGATIVE
NRBC BLD AUTO-RTO: 0 %
PH UR STRIP.AUTO: 6 [PH]
PLATELET # BLD AUTO: 326 X10(3)/MCL (ref 140–371)
PMV BLD AUTO: 10 FL (ref 9.4–12.4)
POTASSIUM SERPL-SCNC: 3.7 MMOL/L (ref 3.5–5.1)
PROT SERPL-MCNC: 6.6 GM/DL (ref 6.3–8.2)
PROT UR QL STRIP.AUTO: NEGATIVE
RBC # BLD AUTO: 4.21 X10(6)/MCL (ref 4–5.1)
RBC UR QL AUTO: NEGATIVE
SODIUM SERPL-SCNC: 139 MMOL/L (ref 135–145)
SP GR UR STRIP.AUTO: >=1.03 (ref 1–1.03)
UROBILINOGEN UR STRIP-ACNC: 0.2
WBC # SPEC AUTO: 5.55 X10(3)/MCL (ref 4–11.5)

## 2023-10-22 PROCEDURE — 80053 COMPREHEN METABOLIC PANEL: CPT | Performed by: FAMILY MEDICINE

## 2023-10-22 PROCEDURE — 99285 EMERGENCY DEPT VISIT HI MDM: CPT | Mod: 25

## 2023-10-22 PROCEDURE — 83690 ASSAY OF LIPASE: CPT | Performed by: FAMILY MEDICINE

## 2023-10-22 PROCEDURE — 25000003 PHARM REV CODE 250: Performed by: INTERNAL MEDICINE

## 2023-10-22 PROCEDURE — C9113 INJ PANTOPRAZOLE SODIUM, VIA: HCPCS | Performed by: INTERNAL MEDICINE

## 2023-10-22 PROCEDURE — 94761 N-INVAS EAR/PLS OXIMETRY MLT: CPT

## 2023-10-22 PROCEDURE — 11000001 HC ACUTE MED/SURG PRIVATE ROOM

## 2023-10-22 PROCEDURE — G0378 HOSPITAL OBSERVATION PER HR: HCPCS

## 2023-10-22 PROCEDURE — 63600175 PHARM REV CODE 636 W HCPCS: Performed by: INTERNAL MEDICINE

## 2023-10-22 PROCEDURE — 81003 URINALYSIS AUTO W/O SCOPE: CPT | Performed by: FAMILY MEDICINE

## 2023-10-22 PROCEDURE — 25000003 PHARM REV CODE 250: Performed by: FAMILY MEDICINE

## 2023-10-22 PROCEDURE — 85025 COMPLETE CBC W/AUTO DIFF WBC: CPT | Performed by: FAMILY MEDICINE

## 2023-10-22 PROCEDURE — 96360 HYDRATION IV INFUSION INIT: CPT

## 2023-10-22 RX ORDER — ENOXAPARIN SODIUM 100 MG/ML
40 INJECTION SUBCUTANEOUS EVERY 24 HOURS
Status: CANCELLED | OUTPATIENT
Start: 2023-10-22

## 2023-10-22 RX ORDER — SODIUM CHLORIDE 9 MG/ML
INJECTION, SOLUTION INTRAVENOUS CONTINUOUS
Status: DISCONTINUED | OUTPATIENT
Start: 2023-10-22 | End: 2023-10-25 | Stop reason: HOSPADM

## 2023-10-22 RX ORDER — DULOXETIN HYDROCHLORIDE 30 MG/1
60 CAPSULE, DELAYED RELEASE ORAL DAILY
Status: DISCONTINUED | OUTPATIENT
Start: 2023-10-23 | End: 2023-10-25 | Stop reason: HOSPADM

## 2023-10-22 RX ORDER — SIMETHICONE 80 MG
1 TABLET,CHEWABLE ORAL 3 TIMES DAILY PRN
Status: DISCONTINUED | OUTPATIENT
Start: 2023-10-22 | End: 2023-10-25 | Stop reason: HOSPADM

## 2023-10-22 RX ORDER — ATORVASTATIN CALCIUM 10 MG/1
10 TABLET, FILM COATED ORAL NIGHTLY
Status: DISCONTINUED | OUTPATIENT
Start: 2023-10-22 | End: 2023-10-25 | Stop reason: HOSPADM

## 2023-10-22 RX ORDER — SODIUM CHLORIDE 0.9 % (FLUSH) 0.9 %
10 SYRINGE (ML) INJECTION
Status: DISCONTINUED | OUTPATIENT
Start: 2023-10-22 | End: 2023-10-22

## 2023-10-22 RX ORDER — ACETAMINOPHEN 325 MG/1
650 TABLET ORAL EVERY 8 HOURS PRN
Status: DISCONTINUED | OUTPATIENT
Start: 2023-10-22 | End: 2023-10-22

## 2023-10-22 RX ORDER — HEPARIN SODIUM 5000 [USP'U]/ML
5000 INJECTION, SOLUTION INTRAVENOUS; SUBCUTANEOUS EVERY 8 HOURS
Status: DISCONTINUED | OUTPATIENT
Start: 2023-10-22 | End: 2023-10-22

## 2023-10-22 RX ORDER — PANTOPRAZOLE SODIUM 40 MG/10ML
40 INJECTION, POWDER, LYOPHILIZED, FOR SOLUTION INTRAVENOUS 2 TIMES DAILY
Status: DISCONTINUED | OUTPATIENT
Start: 2023-10-22 | End: 2023-10-25 | Stop reason: HOSPADM

## 2023-10-22 RX ORDER — ONDANSETRON 2 MG/ML
4 INJECTION INTRAMUSCULAR; INTRAVENOUS EVERY 8 HOURS PRN
Status: DISCONTINUED | OUTPATIENT
Start: 2023-10-22 | End: 2023-10-25 | Stop reason: HOSPADM

## 2023-10-22 RX ORDER — METOPROLOL SUCCINATE 25 MG/1
25 TABLET, EXTENDED RELEASE ORAL DAILY
Status: DISCONTINUED | OUTPATIENT
Start: 2023-10-23 | End: 2023-10-25 | Stop reason: HOSPADM

## 2023-10-22 RX ORDER — TALC
6 POWDER (GRAM) TOPICAL NIGHTLY PRN
Status: DISCONTINUED | OUTPATIENT
Start: 2023-10-22 | End: 2023-10-22

## 2023-10-22 RX ORDER — LISINOPRIL 5 MG/1
5 TABLET ORAL DAILY
Status: DISCONTINUED | OUTPATIENT
Start: 2023-10-23 | End: 2023-10-25 | Stop reason: HOSPADM

## 2023-10-22 RX ORDER — ONDANSETRON 2 MG/ML
4 INJECTION INTRAMUSCULAR; INTRAVENOUS EVERY 8 HOURS PRN
Status: DISCONTINUED | OUTPATIENT
Start: 2023-10-22 | End: 2023-10-22

## 2023-10-22 RX ORDER — TALC
6 POWDER (GRAM) TOPICAL NIGHTLY PRN
Status: DISCONTINUED | OUTPATIENT
Start: 2023-10-22 | End: 2023-10-25 | Stop reason: HOSPADM

## 2023-10-22 RX ORDER — ACETAMINOPHEN 325 MG/1
650 TABLET ORAL EVERY 8 HOURS PRN
Status: DISCONTINUED | OUTPATIENT
Start: 2023-10-22 | End: 2023-10-25 | Stop reason: HOSPADM

## 2023-10-22 RX ORDER — SODIUM CHLORIDE 0.9 % (FLUSH) 0.9 %
10 SYRINGE (ML) INJECTION
Status: DISCONTINUED | OUTPATIENT
Start: 2023-10-22 | End: 2023-10-25 | Stop reason: HOSPADM

## 2023-10-22 RX ADMIN — PANTOPRAZOLE SODIUM 40 MG: 40 INJECTION, POWDER, FOR SOLUTION INTRAVENOUS at 08:10

## 2023-10-22 RX ADMIN — MELATONIN TAB 3 MG 6 MG: 3 TAB at 05:10

## 2023-10-22 RX ADMIN — SIMETHICONE 80 MG: 80 TABLET, CHEWABLE ORAL at 04:10

## 2023-10-22 RX ADMIN — SODIUM CHLORIDE: 9 INJECTION, SOLUTION INTRAVENOUS at 04:10

## 2023-10-22 RX ADMIN — ATORVASTATIN CALCIUM 10 MG: 10 TABLET, FILM COATED ORAL at 08:10

## 2023-10-22 RX ADMIN — SODIUM CHLORIDE 1000 ML: 9 INJECTION, SOLUTION INTRAVENOUS at 12:10

## 2023-10-22 RX ADMIN — PIPERACILLIN AND TAZOBACTAM 4.5 G: 4; .5 INJECTION, POWDER, FOR SOLUTION INTRAVENOUS; PARENTERAL at 04:10

## 2023-10-22 NOTE — H&P
"[facility]    History & Physical      Patient Name: Roya Wills  MRN: [id number]  Admission Date: 10/22/2023  Attending Physician: Jeremy Hairston MD  Primary Care Provider: Amie Mckeon FNP-C         Patient information was obtained from patient and ER records.     Subjective:     Principal Problem:<principal problem not specified>    Chief Complaint:   Chief Complaint   Patient presents with    Abdominal Pain    Nausea    Diarrhea    Vomiting     Pt c/o abdominal "bloating" with accompany n/v/d onset 2 days pta.          HPI:   58 year old woman with history of anxiety/depression ,THN, hLD presents for nausea, vomiting, bloating for last three days. Patient says that symptoms began Friday and consisted of lots of nausea and diarrhea. Denied symptoms like this before. Denied any recent travel. Denied any recent abx use. Patient says that she has been diagnosed with ulcers in past and symptoms presented similarly.     On further questioning patient complained of dark vomitus and stools. Denies any NSAID use    ED course: CT abdomen showed concerns for colitis. Asked Dr. Case to consult surgery regarding potential EGD and he agreed and said he would consult. Admitted for management of colitis, melena/hematemesis.     Past Medical History:   Diagnosis Date    Anxiety and depression     Hyperlipidemia     Hypertension     Insomnia     Known health problems: none     Unspecified osteoarthritis, unspecified site     bilateral knees       Past Surgical History:   Procedure Laterality Date    CARPAL TUNNEL RELEASE       SECTION  1986     SECTION  1988     SECTION  1992     SECTION WITH TUBAL LIGATION  1990       Review of patient's allergies indicates:   Allergen Reactions    Ibuprofen Other (See Comments)     Other reaction(s): Abdominal Pain, Stomach ulcer    Sulfamethoxazole-trimethoprim Nausea And Vomiting       Current " Facility-Administered Medications on File Prior to Encounter   Medication    triamcinolone acetonide injection 40 mg    triamcinolone acetonide injection 40 mg     Current Outpatient Medications on File Prior to Encounter   Medication Sig    atorvastatin (LIPITOR) 10 MG tablet Take 10 mg by mouth.    DULoxetine (CYMBALTA) 60 MG capsule Take 60 mg by mouth.    lisinopriL (PRINIVIL,ZESTRIL) 5 MG tablet     metoprolol succinate (TOPROL-XL) 25 MG 24 hr tablet Take 25 mg by mouth.    QUEtiapine (SEROQUEL) 400 MG tablet Take 400 mg by mouth once daily.     Family History       Problem Relation (Age of Onset)    Breast cancer Maternal Aunt    Colon cancer Mother, Maternal Cousin (59)    No Known Problems Brother, Sister          Tobacco Use    Smoking status: Former     Current packs/day: 0.00     Average packs/day: 1 pack/day for 39.9 years (39.9 ttl pk-yrs)     Types: Cigarettes     Start date:      Quit date: 2021     Years since quittin.8     Passive exposure: Never    Smokeless tobacco: Never   Substance and Sexual Activity    Alcohol use: Never    Drug use: Never    Sexual activity: Not Currently     Birth control/protection: Post-menopausal     Comment: STI: Chlamydia     Review of Systems   All other systems reviewed and are negative.    Objective:     Vital Signs (Most Recent):  Temp: 98.6 °F (37 °C) (10/22/23 1353)  Pulse: 80 (10/22/23 1353)  Resp: 18 (10/22/23 1353)  BP: 107/63 (10/22/23 1353)  SpO2: 97 % (10/22/23 1353) Vital Signs (24h Range):  Temp:  [98 °F (36.7 °C)-98.6 °F (37 °C)] 98.6 °F (37 °C)  Pulse:  [80-97] 80  Resp:  [16-18] 18  SpO2:  [96 %-97 %] 97 %  BP: ()/(63-83) 107/63     Weight: 58.5 kg (129 lb)  Body mass index is 25.19 kg/m².    Physical Exam  Vitals reviewed. Exam conducted with a chaperone present.   Constitutional:       Appearance: Normal appearance. She is normal weight.   HENT:      Head: Normocephalic and atraumatic.      Nose: Nose normal.      Mouth/Throat:       Mouth: Mucous membranes are moist.      Pharynx: Oropharynx is clear.   Eyes:      Extraocular Movements: Extraocular movements intact.      Conjunctiva/sclera: Conjunctivae normal.      Pupils: Pupils are equal, round, and reactive to light.   Cardiovascular:      Rate and Rhythm: Normal rate and regular rhythm.      Pulses: Normal pulses.      Heart sounds: Normal heart sounds.   Pulmonary:      Effort: Pulmonary effort is normal.      Breath sounds: Normal breath sounds.   Abdominal:      General: Bowel sounds are normal. There is distension.   Musculoskeletal:         General: Normal range of motion.      Cervical back: Normal range of motion and neck supple.   Skin:     General: Skin is warm.   Neurological:      General: No focal deficit present.      Mental Status: She is alert. Mental status is at baseline.   Psychiatric:         Mood and Affect: Mood normal.         Thought Content: Thought content normal.         Judgment: Judgment normal.           CRANIAL NERVES     CN III, IV, VI   Pupils are equal, round, and reactive to light.      Significant Labs: All pertinent labs within the past 24 hours have been reviewed.  Recent Lab Results         10/22/23  1240   10/22/23  1236        Albumin/Globulin Ratio 1.2         Albumin 3.6                  ALT 26         Anion Gap 5.0         Appearance, UA   Clear       AST 30         Baso # 0.03         Basophil % 0.5         BILIRUBIN TOTAL 0.4         Bilirubin, UA   Negative       BUN 9.0         BUN/CREAT RATIO 11         Calcium 8.9         Chloride 109         CO2 25         Color, UA   Yellow       Creatinine 0.80         eGFR 87  Comment:                      EGFR INTERPRETATION    Beginning 8/15/22 we are reporting the eGFRcr calculation as recommended by the [employer] Kid[employer]. The eGFRcr equation has similar overall performance characteristics to the older equation, but the values may differ by more than 10% particularly at higher values  of eGFRcr and younger adult ages.    NKF stages of chronic kidney disease (CKD)  Stage 1: Kidney damage with normal or increased eGFR (>90 mL/min/1.73 m^2)  Stage 2: Mild reduction in GFR (60-89 mL/min/1.73 m^2)  Stage 3a: Moderate reduction in GFR (45-59 mL/min/1.73 m^2)  Stage 3b: Moderate reduction in GFR (30-44 mL/min/1.73 m^2)  Stage 4: Severe reduction in GFR (15-29 mL/min/1.73 m^2)  Stage 5: Kidney failure (GFR <15 mL/min/1.73 m^2)             Eos # 0.14         Eosinophil % 2.5         Globulin, Total 3.0         Glucose 87         Glucose, UA   Negative       Hematocrit 39.0         Hemoglobin 13.0         Immature Grans (Abs) 0.02         Immature Granulocytes 0.4         Ketones, UA   Negative       Leukocytes, UA   Negative       Lipase 43         Lymph # 2.26         LYMPH % 40.7         MCH 30.9         MCHC 33.3         MCV 92.6         Mono # 0.44         Mono % 7.9         MPV 10.0         Neut # 2.66         Neut % 48.0         NITRITE UA   Negative       nRBC 0.0         Occult Blood UA   Negative       pH, UA   6.0       Platelet Count 326         Potassium 3.7         PROTEIN TOTAL 6.6         Protein, UA   Negative       RBC 4.21         RDW 12.2         Sodium 139         Specific Gravity,UA   >=1.030       Urobilinogen, UA   0.2       WBC 5.55                 Significant Imaging: I have reviewed all pertinent imaging results/findings within the past 24 hours.  I have reviewed and interpreted all pertinent imaging results/findings within the past 24 hours.    Assessment/Plan:     Active Diagnoses:    Diagnosis Date Noted POA    Intractable nausea and vomiting [R11.2] 10/22/2023 Unknown    Colitis [K52.9] 10/22/2023 Unknown      Problems Resolved During this Admission:     VTE Risk Mitigation (From admission, onward)      None          *Hematemesis  *Melenic stools  *Colitis  - CT abdomen demonstrated fluid filled distal small bowel loops with adjacent fat stranding, enteritis  - Clear liquid  diet for now  - PPI IV BID started   - Zosyn started   - H/H currently stable  - discussed with ED physician Dr. Case to call surgery and ask if he will accept for endoscopy and Dr. Case agreed to do it    *HLD  - Continue home statin    *HTN  - Continue home medications    Code Status: Full Code  Diet: Clear liquid  DVT PPX: SCDs    Services provided via audio/visual telemedicine  Patient location: [place], [place]  Provider location: [place], [place]          Jeremy Hairston MD  Department of Hospital Medicine   [facility]

## 2023-10-22 NOTE — NURSING
Arrived to floor in stable condition via wheel chair, ambulated to restroom without difficulty, gait steady, abdominal tenderness present, no family at bedside

## 2023-10-22 NOTE — ED NOTES
"Pt reports "I have had N/V/D since Friday and my belly blew up today." Pt abdomen distended. Pt capillary refill <3. Pt acyanotic. No acute distress noted.     "

## 2023-10-22 NOTE — ED PROVIDER NOTES
"Encounter Date: 10/22/2023       History       Chief Complaint  Patient presents with  · Abdominal Pain  · Nausea  · Diarrhea  · Vomiting    Pt c/o abdominal "bloating" with accompany n/v/d onset 2 days pta.  Patient says that about 2 days ago she started having nausea vomiting and diarrhea and the diarrhea stopped yesterday and she started having abdominal bloating and she is tender in the right lower quadrant and also in the epigastric area          Review of patient's allergies indicates:   Allergen Reactions    Ibuprofen Other (See Comments)     Other reaction(s): Abdominal Pain, Stomach ulcer    Sulfamethoxazole-trimethoprim Nausea And Vomiting     Past Medical History:   Diagnosis Date    Anxiety and depression     Hyperlipidemia     Hypertension     Insomnia     Known health problems: none     Unspecified osteoarthritis, unspecified site     bilateral knees     Past Surgical History:   Procedure Laterality Date    CARPAL TUNNEL RELEASE       SECTION  1986     SECTION  1988     SECTION  1992     SECTION WITH TUBAL LIGATION  1990     Family History   Problem Relation Age of Onset    Colon cancer Mother         onset unknown    No Known Problems Brother     No Known Problems Sister     Breast cancer Maternal Aunt         onset unknown    Colon cancer Maternal Cousin 59    Ovarian cancer Neg Hx     Uterine cancer Neg Hx     Cervical cancer Neg Hx      Social History     Tobacco Use    Smoking status: Former     Current packs/day: 0.00     Average packs/day: 1 pack/day for 39.9 years (39.9 ttl pk-yrs)     Types: Cigarettes     Start date:      Quit date: 2021     Years since quittin.8     Passive exposure: Never    Smokeless tobacco: Never   Substance Use Topics    Alcohol use: Never    Drug use: Never     Review of Systems   Constitutional:  Negative for activity change, appetite change and fever.   HENT:  Negative for congestion, dental " problem, drooling, ear discharge and sore throat.    Eyes:  Negative for pain, discharge and itching.   Respiratory:  Negative for apnea, shortness of breath and stridor.    Cardiovascular:  Negative for chest pain.   Gastrointestinal:  Positive for abdominal distention, abdominal pain, nausea and vomiting.   Endocrine: Negative for cold intolerance, heat intolerance and polydipsia.   Genitourinary:  Negative for dysuria and hematuria.   Musculoskeletal:  Negative for back pain.   Skin:  Negative for rash.   Neurological:  Negative for seizures, weakness, light-headedness, numbness and headaches.   Hematological:  Does not bruise/bleed easily.   Psychiatric/Behavioral:  Negative for agitation, behavioral problems, confusion, decreased concentration and dysphoric mood.        Physical Exam     Initial Vitals [10/22/23 1038]   BP Pulse Resp Temp SpO2   91/65 97 16 98 °F (36.7 °C) 97 %      MAP       --         Physical Exam    Nursing note and vitals reviewed.  Constitutional: She appears well-developed.   Eyes: Pupils are equal, round, and reactive to light.   Neck:   Normal range of motion.  Cardiovascular:  Normal rate, regular rhythm, normal heart sounds and intact distal pulses.           Pulmonary/Chest: Breath sounds normal.   Abdominal:   Diffuse abdominal tenderness all across the abdomen in the epigastric area and in the right lower quadrant patient still has her appendix   Musculoskeletal:         General: Normal range of motion.      Cervical back: Normal range of motion.     Skin: Skin is warm.   Psychiatric: She has a normal mood and affect.         ED Course   Procedures  Labs Reviewed   COMPREHENSIVE METABOLIC PANEL - Abnormal; Notable for the following components:       Result Value    BUN/Creatinine Ratio 11 (*)     All other components within normal limits   CBC WITH DIFFERENTIAL - Abnormal; Notable for the following components:    Mono # 0.44 (*)     All other components within normal limits    LIPASE - Normal   URINALYSIS, REFLEX TO URINE CULTURE - Normal    Narrative:      URINE STABILITY IS 2 HOURS AT ROOM TEMP OR    SIX HOURS REFRIGERATED. PERFORMING TESTING ON    SPECIMENS GREATER THAN THIS AGE MAY AFFECT THE    FOLLOWING TESTS:    PH          SPECIFIC GRAVITY           BLOOD    CLARITY     BILIRUBIN               UROBILINOGEN   CBC W/ AUTO DIFFERENTIAL    Narrative:     The following orders were created for panel order CBC Auto Differential.  Procedure                               Abnormality         Status                     ---------                               -----------         ------                     CBC with Differential[[id number]]       Abnormal            Final result                 Please view results for these tests on the individual orders.          Imaging Results              CT Abdomen Pelvis  Without Contrast (Final result)  Result time 10/22/23 13:45:08      Final result by Kwasi Richter MD (10/22/23 13:45:08)                   Impression:      1. Few fluid-filled distal small bowel loops with mild adjacent fat stranding.  Correlate for enteritis.  2. Few colonic diverticula without evidence of acute diverticulitis.      Electronically signed by: Kwasi Richter MD  Date:    10/22/2023  Time:    13:45               Narrative:    EXAMINATION:  CT ABDOMEN PELVIS WITHOUT CONTRAST    CLINICAL HISTORY:  Abdominal pain, nausea, vomiting, diarrhea    TECHNIQUE:  Axial CT images of the abdomen and pelvis were obtained without intravenous contrast.   Coronal and sagittal reformations were obtained.  Automated exposure control was utilized.  Total exam DLP is 293 mGy cm.    COMPARISON:  07/10/2023    FINDINGS:  There is atelectasis/scarring at the lung bases.  There is a minimal hiatal hernia present.  The non contrasted liver, gallbladder, pancreas, spleen, and adrenal glands are unremarkable.  The kidneys demonstrate no hydronephrosis or nephrolithiasis.  There is no bowel  obstruction.  A few colonic diverticula are noted without evidence of acute diverticulitis.  The appendix is normal.  No bowel obstruction is noted.  A few fluid-filled distal small bowel loops identified.  There appears to be slight fat stranding.  Correlate for enteritis.  Atherosclerotic calcifications are noted.  Abdominal aorta is not aneurysmal.  There is no intraperitoneal free air, free fluid, or lymphadenopathy identified.  Urinary bladder is decompressed.  No acute displaced fractures identified.                                       Medications   simethicone chewable tablet 80 mg (has no administration in time range)   sodium chloride 0.9% bolus 1,000 mL 1,000 mL (0 mLs Intravenous Stopped 10/22/23 1402)     Medical Decision Making  I admitted the patient to Dr. Hairston I was going to admit the patient for possible gastroenteritis but when the hospitalist started talking to the patient the patient complained of coffee-ground emesis and the hospitalist thought that it would be prudent if involve GI and consult surgeon I talked to Dr. Moody and he accepted the consult we will place the surgical consult for possible scope    Amount and/or Complexity of Data Reviewed  Labs: ordered.  Radiology: ordered.                               Clinical Impression:   Final diagnoses:  [R11.2] Intractable nausea and vomiting        ED Disposition Condition    Observation                 El Smalls MD  10/22/23 6910

## 2023-10-22 NOTE — PROGRESS NOTES
Pharmacist Renal Dose Adjustment Note    Roya Wills is a 58 y.o. female being treated with the medication piperacillin-tazobactam (Zosyn).     Patient Data:    Vital Signs (Most Recent):  Temp: 97.7 °F (36.5 °C) (10/22/23 1619)  Pulse: 91 (10/22/23 1619)  Resp: 18 (10/22/23 1619)  BP: 127/75 (10/22/23 1619)  SpO2: 98 % (10/22/23 1557) Vital Signs (72h Range):  Temp:  [97.7 °F (36.5 °C)-98.6 °F (37 °C)]   Pulse:  [80-97]   Resp:  [16-18]   BP: ()/(63-86)   SpO2:  [96 %-98 %]      Recent Labs   Lab 10/22/23  1240   CREATININE 0.80     Serum creatinine: 0.8 mg/dL 10/22/23 1240  Estimated creatinine clearance: 62.8 mL/min    Piperacillin-tazobactam 3.375g IV every 8 hours will be changed to piperacillin-tazobactam 4.5 grams IV every 8 hours (extended infusion) per renal dose protocol for CrCl > 20 ml/min.     Thank you,  Pharmacist's Name: Nicholas Hernadez

## 2023-10-22 NOTE — Clinical Note
Diagnosis: Intractable nausea and vomiting [465936]   Future Attending Provider: ESTELLA ANDRES [[id number]]   Admitting Provider:: JODEE FREDERICK [[id number]]

## 2023-10-23 LAB
ALBUMIN SERPL-MCNC: 2.6 G/DL (ref 3.4–5)
ALBUMIN/GLOB SERPL: 1.3 RATIO
ALP SERPL-CCNC: 75 UNIT/L (ref 50–144)
ALT SERPL-CCNC: 22 UNIT/L (ref 1–45)
ANION GAP SERPL CALC-SCNC: 4 MEQ/L (ref 2–13)
AST SERPL-CCNC: 23 UNIT/L (ref 14–36)
BASOPHILS # BLD AUTO: 0.01 X10(3)/MCL (ref 0.01–0.08)
BASOPHILS NFR BLD AUTO: 0.3 % (ref 0.1–1.2)
BILIRUB SERPL-MCNC: 0.4 MG/DL (ref 0–1)
BUN SERPL-MCNC: 6 MG/DL (ref 7–20)
CALCIUM SERPL-MCNC: 8.3 MG/DL (ref 8.4–10.2)
CHLORIDE SERPL-SCNC: 112 MMOL/L (ref 98–110)
CO2 SERPL-SCNC: 24 MMOL/L (ref 21–32)
CREAT SERPL-MCNC: 0.87 MG/DL (ref 0.66–1.25)
CREAT/UREA NIT SERPL: 7 (ref 12–20)
EOSINOPHIL # BLD AUTO: 0.11 X10(3)/MCL (ref 0.04–0.36)
EOSINOPHIL NFR BLD AUTO: 2.9 % (ref 0.7–7)
ERYTHROCYTE [DISTWIDTH] IN BLOOD BY AUTOMATED COUNT: 12.2 % (ref 11–14.5)
GFR SERPLBLD CREATININE-BSD FMLA CKD-EPI: 78 MLS/MIN/1.73/M2
GLOBULIN SER-MCNC: 2 GM/DL (ref 2–3.9)
GLUCOSE SERPL-MCNC: 82 MG/DL (ref 70–115)
HCT VFR BLD AUTO: 30.4 % (ref 36–48)
HGB BLD-MCNC: 10.1 G/DL (ref 11.8–16)
IMM GRANULOCYTES # BLD AUTO: 0.01 X10(3)/MCL (ref 0–0.03)
IMM GRANULOCYTES NFR BLD AUTO: 0.3 % (ref 0–0.5)
LYMPHOCYTES # BLD AUTO: 1.83 X10(3)/MCL (ref 1.16–3.74)
LYMPHOCYTES NFR BLD AUTO: 49.1 % (ref 20–55)
MCH RBC QN AUTO: 30.6 PG (ref 27–34)
MCHC RBC AUTO-ENTMCNC: 33.2 G/DL (ref 31–37)
MCV RBC AUTO: 92.1 FL (ref 79–99)
MONOCYTES # BLD AUTO: 0.38 X10(3)/MCL (ref 0.24–0.36)
MONOCYTES NFR BLD AUTO: 10.2 % (ref 4.7–12.5)
NEUTROPHILS # BLD AUTO: 1.39 X10(3)/MCL (ref 1.56–6.13)
NEUTROPHILS NFR BLD AUTO: 37.2 % (ref 37–73)
NRBC BLD AUTO-RTO: 0 %
PLATELET # BLD AUTO: 239 X10(3)/MCL (ref 140–371)
PMV BLD AUTO: 9.6 FL (ref 9.4–12.4)
POTASSIUM SERPL-SCNC: 3.4 MMOL/L (ref 3.5–5.1)
PROT SERPL-MCNC: 4.6 GM/DL (ref 6.3–8.2)
RBC # BLD AUTO: 3.3 X10(6)/MCL (ref 4–5.1)
SODIUM SERPL-SCNC: 140 MMOL/L (ref 135–145)
WBC # SPEC AUTO: 3.73 X10(3)/MCL (ref 4–11.5)

## 2023-10-23 PROCEDURE — 63600175 PHARM REV CODE 636 W HCPCS: Performed by: INTERNAL MEDICINE

## 2023-10-23 PROCEDURE — 25000003 PHARM REV CODE 250: Performed by: FAMILY MEDICINE

## 2023-10-23 PROCEDURE — 94761 N-INVAS EAR/PLS OXIMETRY MLT: CPT

## 2023-10-23 PROCEDURE — C9113 INJ PANTOPRAZOLE SODIUM, VIA: HCPCS | Performed by: INTERNAL MEDICINE

## 2023-10-23 PROCEDURE — 85025 COMPLETE CBC W/AUTO DIFF WBC: CPT | Performed by: INTERNAL MEDICINE

## 2023-10-23 PROCEDURE — 36415 COLL VENOUS BLD VENIPUNCTURE: CPT | Performed by: INTERNAL MEDICINE

## 2023-10-23 PROCEDURE — 11000001 HC ACUTE MED/SURG PRIVATE ROOM

## 2023-10-23 PROCEDURE — 25000003 PHARM REV CODE 250: Performed by: INTERNAL MEDICINE

## 2023-10-23 PROCEDURE — 80053 COMPREHEN METABOLIC PANEL: CPT | Performed by: INTERNAL MEDICINE

## 2023-10-23 RX ORDER — SUCRALFATE 1 G/1
1 TABLET ORAL
Status: DISCONTINUED | OUTPATIENT
Start: 2023-10-23 | End: 2023-10-25 | Stop reason: HOSPADM

## 2023-10-23 RX ORDER — FAMOTIDINE 20 MG/1
20 TABLET, FILM COATED ORAL 2 TIMES DAILY
Status: DISCONTINUED | OUTPATIENT
Start: 2023-10-23 | End: 2023-10-25 | Stop reason: HOSPADM

## 2023-10-23 RX ORDER — POTASSIUM CHLORIDE 20 MEQ/1
40 TABLET, EXTENDED RELEASE ORAL DAILY
Status: DISCONTINUED | OUTPATIENT
Start: 2023-10-23 | End: 2023-10-25 | Stop reason: HOSPADM

## 2023-10-23 RX ADMIN — PANTOPRAZOLE SODIUM 40 MG: 40 INJECTION, POWDER, FOR SOLUTION INTRAVENOUS at 08:10

## 2023-10-23 RX ADMIN — MELATONIN TAB 3 MG 6 MG: 3 TAB at 08:10

## 2023-10-23 RX ADMIN — ATORVASTATIN CALCIUM 10 MG: 10 TABLET, FILM COATED ORAL at 08:10

## 2023-10-23 RX ADMIN — FAMOTIDINE 20 MG: 20 TABLET ORAL at 05:10

## 2023-10-23 RX ADMIN — SODIUM CHLORIDE: 9 INJECTION, SOLUTION INTRAVENOUS at 05:10

## 2023-10-23 RX ADMIN — METOPROLOL SUCCINATE 25 MG: 25 TABLET, EXTENDED RELEASE ORAL at 09:10

## 2023-10-23 RX ADMIN — PIPERACILLIN AND TAZOBACTAM 4.5 G: 4; .5 INJECTION, POWDER, FOR SOLUTION INTRAVENOUS; PARENTERAL at 09:10

## 2023-10-23 RX ADMIN — PIPERACILLIN AND TAZOBACTAM 4.5 G: 4; .5 INJECTION, POWDER, FOR SOLUTION INTRAVENOUS; PARENTERAL at 12:10

## 2023-10-23 RX ADMIN — POTASSIUM CHLORIDE 40 MEQ: 1500 TABLET, EXTENDED RELEASE ORAL at 01:10

## 2023-10-23 RX ADMIN — PIPERACILLIN AND TAZOBACTAM 4.5 G: 4; .5 INJECTION, POWDER, FOR SOLUTION INTRAVENOUS; PARENTERAL at 05:10

## 2023-10-23 RX ADMIN — PANTOPRAZOLE SODIUM 40 MG: 40 INJECTION, POWDER, FOR SOLUTION INTRAVENOUS at 09:10

## 2023-10-23 RX ADMIN — ACETAMINOPHEN 650 MG: 325 TABLET, FILM COATED ORAL at 07:10

## 2023-10-23 RX ADMIN — SUCRALFATE 1 G: 1 TABLET ORAL at 08:10

## 2023-10-23 RX ADMIN — SUCRALFATE 1 G: 1 TABLET ORAL at 05:10

## 2023-10-23 NOTE — SUBJECTIVE & OBJECTIVE
Interval History:      Review of Systems   Constitutional:  Negative for activity change, appetite change and fever.   Respiratory:  Negative for chest tightness, shortness of breath and wheezing.    Cardiovascular:  Negative for chest pain.   Gastrointestinal:  Positive for diarrhea, nausea and vomiting. Negative for abdominal pain and constipation.        None since admit   Genitourinary:  Negative for dysuria.   Skin:  Negative for rash and wound.   Neurological:  Negative for tremors and headaches.     Objective:     Vital Signs (Most Recent):  Temp: 97 °F (36.1 °C) (10/23/23 1043)  Pulse: 79 (10/23/23 1417)  Resp: 20 (10/23/23 1417)  BP: 107/71 (10/23/23 1417)  SpO2: 99 % (10/23/23 1417) Vital Signs (24h Range):  Temp:  [97 °F (36.1 °C)-98.5 °F (36.9 °C)] 97 °F (36.1 °C)  Pulse:  [68-91] 79  Resp:  [18-20] 20  SpO2:  [96 %-99 %] 99 %  BP: ()/(43-86) 107/71     Weight: 58.6 kg (129 lb 3.2 oz)  Body mass index is 25.23 kg/m².    Intake/Output Summary (Last 24 hours) at 10/23/2023 1448  Last data filed at 10/23/2023 1417  Gross per 24 hour   Intake 2147 ml   Output --   Net 2147 ml         Physical Exam  Vitals and nursing note reviewed. Exam conducted with a chaperone present.   Constitutional:       General: She is not in acute distress.     Appearance: Normal appearance. She is normal weight. She is not ill-appearing.   HENT:      Head: Normocephalic and atraumatic.   Eyes:      General: No scleral icterus.     Extraocular Movements: Extraocular movements intact.   Cardiovascular:      Rate and Rhythm: Normal rate and regular rhythm.      Pulses: Normal pulses.      Heart sounds: Normal heart sounds.   Pulmonary:      Effort: Pulmonary effort is normal.      Breath sounds: Normal breath sounds.   Abdominal:      General: Abdomen is flat. Bowel sounds are normal.      Palpations: Abdomen is soft.   Skin:     General: Skin is warm and dry.      Capillary Refill: Capillary refill takes less than 2 seconds.       Findings: No erythema, lesion or rash.   Neurological:      General: No focal deficit present.      Mental Status: She is alert and oriented to person, place, and time.   Psychiatric:         Mood and Affect: Mood normal.         Behavior: Behavior normal.         Thought Content: Thought content normal.             Significant Labs: All pertinent labs within the past 24 hours have been reviewed.  BMP:   Recent Labs   Lab 10/23/23  0441      K 3.4*   CO2 24   BUN 6.0*   CREATININE 0.87   CALCIUM 8.3*     CBC:   Recent Labs   Lab 10/22/23  1240 10/23/23  0441   WBC 5.55 3.73*   HGB 13.0 10.1*   HCT 39.0 30.4*    239       Significant Imaging: I have reviewed all pertinent imaging results/findings within the past 24 hours.

## 2023-10-23 NOTE — CONSULTS
Patient is a 58 yr old patient that presents to the ER with complaints of abdominal bloating with accompany nausea, vomiting and diarrhea onset of 2 days ago. Patient says about 2 days ago she started having nausea, vomiting, and diarrhea and the diarrhea stopped yesterday and she started having abdominal bloating and she is tender int her right lower quadrant and also in the epigastric area. After discussing problem with Dr, patient complains of coffee-ground emesis.     Patient allergies is   Ibuprofen reaction is abdominal pain and stomach ulcer which causes ulcer to bleed.  Sulfamethoxazole -trimethoprim reaction is nausea and vomiting    Past Medical History   Anxiety and Depression   Hyperlipidemia  Hypertension  Insomnia  Unspecified osteoarthritis, bilateral knees    Past Surgical History   Carpal Tunnel History > 2 yrs ago   Section - 1986   Section - 1988   Section -1992   Section with Tubal Ligation - 1990  Colonoscopy . 2 yrs - Dr Arron Byers  EGD - 2 yrs - Dr. Arron Byers  Angiogram - Dr Arnold - 3 yrs  Stress Test - Dr Arnold - 3 yrs  Echocardiogram - Dr. Arnold - 3 yrs    Imminizations  Covid 19 Vaccine - 10/07/2022, 2022, 2021, 04/15/2021  Flu - 2022  No Pneumonia vaccine  No Hepatitis vaccine  Shingles vacccine -   No DTAP vaccine    Family History   Mother  due to Heart Problem and Colon Ca  Father (Did Not know Him)  Maternal Aunt - Breast Ca  Maternal Cousin - Colon Ca    Social History  Former Smoker - Quit 1 yr ago, but smoked at least 1 PPD for 44 yrs  Quit Drinking 8 yrs ago, 12 baltazar a week X 30 yrs.  No Drugs  No  Service  No senior living Time  No Rehab  Not ,  X1, 5 children, G5, P6, AB1, 3 boys, 2 girls, 1 stillborn. 1 Girl -Refinery, 1 Girl RN, 1 girl pallet Co, 1 boy group home, 1 boy lives with mother.   Disabled since 2023  Education level - 7 th grade  Resides in  [place] PcP is MOLLY [place]    Review of Systems   Patient is positive for abdominal distention, abdominal pain, nausea and vomiting     Objective  Vital Signs   BP is 91/65  Pulse is 97  Resp is 16  Temp is 98F(36.7 C)  Spo2 is 97%  Weight is 58.6 kg(129 lb 3.2 oz)  Height is 5' (152.4 cm)  Body Mass Index is 25.23 kg/m2    Physical Exam   Patient appears well -developed, does appear to have Top & Bottom dentures  Pupils are round, equal and reactive to light  Neck is supple with normal range of motion  Normal rate and rhythm with normal heart sounds  Diffuse abdominal tenderness all across the abdomen in the epigastric area and in the right lower quadrant and patient does still have her appendix  Skin is warm and dry  Patient has a normal mood and affect    Lab findings  CBC  5.55>       13.0/39.0    < 326     92.6/30.9  CMP   139/3.7         109/25       9.0/0.80   < 87      8.9/       CT Abdomen Pelvis Without Contrast   Impression  1. Few fluid-filled distal small bowel loops with mild adjacent fat stranding.  Correlate for enteritis.  2. Few colonic diverticula without evidence of acute diverticulitis.    NM GI Bleed Study  Impression   There is normal uptake in the liver and spleen and in the urinary bladder.  No abnormal lesions are seen to indicate an active bleed   No evidence of an active GI bleed.    Assessment   Patient is a 58 yr old patient that presents to the ER with complaints of abdominal bloating with accompany nausea, vomiting and diarrhea onset of 2 days ago. Patient says about 2 days ago she started having nausea, vomiting, and diarrhea and the diarrhea stopped yesterday and she started having abdominal bloating and she is tender int her right lower quadrant and also in the epigastric area. After discussing problem with , patient complains of coffee-ground emesis.     Plan   IV Fluids  EGD in am  Check US results  Check Hida Scan W/ CCK

## 2023-10-23 NOTE — PROGRESS NOTES
"[facility] Medicine  Progress Note    Patient Name: Roya Wills  MRN: [id number]  Patient Class: OP- Observation   Admission Date: 10/22/2023  Length of Stay: 0 days  Attending Physician: Koko Kramer MD  Primary Care Provider: Amie Mckeon FNP-C        Subjective:     Principal Problem:Intractable nausea and vomiting        HPI:   Abdominal Pain    Nausea    Diarrhea    Vomiting       Pt c/o abdominal "bloating" with accompany n/v/d onset 2 days pta.           HPI:   58 year old woman with history of anxiety/depression ,THN, hLD presents for nausea, vomiting, bloating for last three days. Patient says that symptoms began Friday and consisted of lots of nausea and diarrhea. Denied symptoms like this before. Denied any recent travel. Denied any recent abx use. Patient says that she has been diagnosed with ulcers in past and symptoms presented similarly.      On further questioning patient complained of dark vomitus and stools. Denies any NSAID use     ED course: CT abdomen showed concerns for colitis. Asked Dr. Case to consult surgery regarding potential EGD and he agreed and said he would consult. Admitted for management of colitis, melena/hematemesis.          Overview/Hospital Course:  10/23/2023 h/o bleeding ulcers in the past, admitted with nausea, vomting and bloating some diarrhea, none since admit.  NM bleeding scan doen this am, CT abd showed enteritis.  K is 3.4 this am.  She is NPO for possible EGD today.      Interval History:      Review of Systems   Constitutional:  Negative for activity change, appetite change and fever.   Respiratory:  Negative for chest tightness, shortness of breath and wheezing.    Cardiovascular:  Negative for chest pain.   Gastrointestinal:  Positive for diarrhea, nausea and vomiting. Negative for abdominal pain and constipation.        None since admit   Genitourinary:  Negative for dysuria.   Skin:  Negative for rash and wound. "   Neurological:  Negative for tremors and headaches.     Objective:     Vital Signs (Most Recent):  Temp: 97 °F (36.1 °C) (10/23/23 1043)  Pulse: 79 (10/23/23 1417)  Resp: 20 (10/23/23 1417)  BP: 107/71 (10/23/23 1417)  SpO2: 99 % (10/23/23 1417) Vital Signs (24h Range):  Temp:  [97 °F (36.1 °C)-98.5 °F (36.9 °C)] 97 °F (36.1 °C)  Pulse:  [68-91] 79  Resp:  [18-20] 20  SpO2:  [96 %-99 %] 99 %  BP: ()/(43-86) 107/71     Weight: 58.6 kg (129 lb 3.2 oz)  Body mass index is 25.23 kg/m².    Intake/Output Summary (Last 24 hours) at 10/23/2023 1448  Last data filed at 10/23/2023 1417  Gross per 24 hour   Intake 2147 ml   Output --   Net 2147 ml         Physical Exam  Vitals and nursing note reviewed. Exam conducted with a chaperone present.   Constitutional:       General: She is not in acute distress.     Appearance: Normal appearance. She is normal weight. She is not ill-appearing.   HENT:      Head: Normocephalic and atraumatic.   Eyes:      General: No scleral icterus.     Extraocular Movements: Extraocular movements intact.   Cardiovascular:      Rate and Rhythm: Normal rate and regular rhythm.      Pulses: Normal pulses.      Heart sounds: Normal heart sounds.   Pulmonary:      Effort: Pulmonary effort is normal.      Breath sounds: Normal breath sounds.   Abdominal:      General: Abdomen is flat. Bowel sounds are normal.      Palpations: Abdomen is soft.   Skin:     General: Skin is warm and dry.      Capillary Refill: Capillary refill takes less than 2 seconds.      Findings: No erythema, lesion or rash.   Neurological:      General: No focal deficit present.      Mental Status: She is alert and oriented to person, place, and time.   Psychiatric:         Mood and Affect: Mood normal.         Behavior: Behavior normal.         Thought Content: Thought content normal.             Significant Labs: All pertinent labs within the past 24 hours have been reviewed.  BMP:   Recent Labs   Lab 10/23/23  0441       K 3.4*   CO2 24   BUN 6.0*   CREATININE 0.87   CALCIUM 8.3*     CBC:   Recent Labs   Lab 10/22/23  1240 10/23/23  0441   WBC 5.55 3.73*   HGB 13.0 10.1*   HCT 39.0 30.4*    239       Significant Imaging: I have reviewed all pertinent imaging results/findings within the past 24 hours.      Assessment/Plan:      * Intractable nausea and vomiting  imrpoved this am  Tolerating clear liquids      Enteritis  Continue iv abx  Surgery to evaluate, h/o PUD in the past          VTE Risk Mitigation (From admission, onward)         Ordered     Place sequential compression device  Until discontinued         10/22/23 1945     IP VTE LOW RISK PATIENT  Once         10/22/23 1622                Discharge Planning   DORON:      Code Status: Full Code   Is the patient medically ready for discharge?:     Reason for patient still in hospital (select all that apply): Patient trending condition and Consult recommendations  Discharge Plan A: Home                  Venessa Castano MD  Department of Hospital Medicine   [facility]     [id number]

## 2023-10-23 NOTE — HPI
" Abdominal Pain    Nausea    Diarrhea    Vomiting       Pt c/o abdominal "bloating" with accompany n/v/d onset 2 days pta.           HPI:   58 year old woman with history of anxiety/depression ,THN, hLD presents for nausea, vomiting, bloating for last three days. Patient says that symptoms began Friday and consisted of lots of nausea and diarrhea. Denied symptoms like this before. Denied any recent travel. Denied any recent abx use. Patient says that she has been diagnosed with ulcers in past and symptoms presented similarly.      On further questioning patient complained of dark vomitus and stools. Denies any NSAID use     ED course: CT abdomen showed concerns for colitis. Asked Dr. Case to consult surgery regarding potential EGD and he agreed and said he would consult. Admitted for management of colitis, melena/hematemesis.      "

## 2023-10-23 NOTE — NURSING
Patient stated she wanted to go down to smoke. Patient educated on risks of leaving the floor and that she would assume all responsibility. Patient verbalized understanding and denies further questions at this time.

## 2023-10-23 NOTE — PLAN OF CARE
10/23/23 1400   Discharge Assessment   Assessment Type Discharge Planning Assessment   Confirmed/corrected address, phone number and insurance Yes   Confirmed Demographics Correct on Facesheet   Source of Information patient   When was your last doctors appointment? 08/09/23   Reason For Admission Intractable Nausea and Vomiting, GI Bleed   People in Home child(gail), adult;grandchild(gail)   Do you expect to return to your current living situation? Yes   Prior to hospitilization cognitive status: Alert/Oriented   Current cognitive status: Alert/Oriented   Equipment Currently Used at Home none   Readmission within 30 days? No   Do you currently have service(s) that help you manage your care at home? No   Do you take prescription medications? Yes   Do you have prescription coverage? Yes   [place]   Do you have any problems affording any of your prescribed medications? No   Is the patient taking medications as prescribed? yes   Who is going to help you get home at discharge? Son   How do you get to doctors appointments? car, drives self   Are you on dialysis? No   Do you take coumadin? No   DME Needed Upon Discharge  none   Discharge Plan discussed with: Patient   Transition of Care Barriers None   Discharge Plan A Home   Physical Activity   On average, how many days per week do you engage in moderate to strenuous exercise (like a brisk walk)? 4 days   On average, how many minutes do you engage in exercise at this level? 20 min   Financial Resource Strain   How hard is it for you to pay for the very basics like food, housing, medical care, and heating? Somewhat   Housing Stability   In the last 12 months, was there a time when you were not able to pay the mortgage or rent on time? Y   In the last 12 months, how many places have you lived? 1   In the last 12 months, was there a time when you did not have a steady place to sleep or slept in a shelter (including now)? N   Transportation Needs   In the past 12  months, has lack of transportation kept you from medical appointments or from getting medications? no   In the past 12 months, has lack of transportation kept you from meetings, work, or from getting things needed for daily living? No   Food Insecurity   Within the past 12 months, you worried that your food would run out before you got the money to buy more. Never true   Within the past 12 months, the food you bought just didn't last and you didn't have money to get more. Never true   Stress   Do you feel stress - tense, restless, nervous, or anxious, or unable to sleep at night because your mind is troubled all the time - these days? Rather much   Social Connections   In a typical week, how many times do you talk on the phone with family, friends, or neighbors? Three   How often do you get together with friends or relatives? More than 3   How often do you attend Moravian or Temple services? Never   Do you belong to any clubs or organizations such as Moravian groups, unions, fraternal or athletic groups, or school groups? No   How often do you attend meetings of the clubs or organizations you belong to? Never   Are you , , , , never , or living with a partner?    Alcohol Use   Q1: How often do you have a drink containing alcohol? Never   Q2: How many drinks containing alcohol do you have on a typical day when you are drinking? None   Q3: How often do you have six or more drinks on one occasion? Never

## 2023-10-23 NOTE — H&P (VIEW-ONLY)
Patient is a 58 yr old patient that presents to the ER with complaints of abdominal bloating with accompany nausea, vomiting and diarrhea onset of 2 days ago. Patient says about 2 days ago she started having nausea, vomiting, and diarrhea and the diarrhea stopped yesterday and she started having abdominal bloating and she is tender int her right lower quadrant and also in the epigastric area. After discussing problem with Dr, patient complains of coffee-ground emesis.     Patient allergies is   Ibuprofen reaction is abdominal pain and stomach ulcer which causes ulcer to bleed.  Sulfamethoxazole -trimethoprim reaction is nausea and vomiting    Past Medical History   Anxiety and Depression   Hyperlipidemia  Hypertension  Insomnia  Unspecified osteoarthritis, bilateral knees    Past Surgical History   Carpal Tunnel History > 2 yrs ago   Section - 1986   Section - 1988   Section -1992   Section with Tubal Ligation - 1990  Colonoscopy . 2 yrs - Dr Arron Byers  EGD - 2 yrs - Dr. Arron Byers  Angiogram - Dr Arnold - 3 yrs  Stress Test - Dr Arnold - 3 yrs  Echocardiogram - Dr. Arnold - 3 yrs    Imminizations  Covid 19 Vaccine - 10/07/2022, 2022, 2021, 04/15/2021  Flu - 2022  No Pneumonia vaccine  No Hepatitis vaccine  Shingles vacccine -   No DTAP vaccine    Family History   Mother  due to Heart Problem and Colon Ca  Father unknown by patient   Maternal Aunt - Breast Ca  Maternal Cousin - Colon Ca    Social History  Former Smoker - Quit 1 yr ago, but smoked at least 1 PPD for 44 yrs  No Alcohol   No Drugs  No  Service  No alf Time  No Rehab  Not ,  X1, 5 children, G5, P6, AB1, 3 boys, 2 girls, 1 stillborn.   Disabled since 2023  Education level - 7 th grade  Resides in [place] [id number]. PcP is MOLLY Christian    Review of Systems   Patient is positive for abdominal distention, abdominal pain, nausea  and vomiting     Objective  Vital Signs   BP is 91/65  Pulse is 97  Resp is 16  Temp is 98F(36.7 C)  Spo2 is 97%  Weight is 58.6 kg(129 lb 3.2 oz)  Height is 5' (152.4 cm)  Body Mass Index is 25.23 kg/m2    Physical Exam   Patient appears well -developed, does appear to have Top & Bottom dentures  Pupils are round, equal and reactive to light  Neck is supple with normal range of motion  Normal rate and rhythm with normal heart sounds  Diffuse abdominal tenderness all across the abdomen in the epigastric area and in the right lower quadrant and patient does still have her appendix  Skin is warm and dry  Patient has a normal mood and affect    Lab findings  CBC  5.55>       13.0/39.0    < 326     92.6/30.9  CMP   139/3.7         109/25       9.0/0.80   < 87      8.9/       CT Abdomen Pelvis Without Contrast   Impression  1. Few fluid-filled distal small bowel loops with mild adjacent fat stranding.  Correlate for enteritis.  2. Few colonic diverticula without evidence of acute diverticulitis.    NM GI Bleed Study  Impression   There is normal uptake in the liver and spleen and in the urinary bladder.  No abnormal lesions are seen to indicate an active bleed   No evidence of an active GI bleed.    Assessment   Patient is a 58 yr old patient that presents to the ER with complaints of abdominal bloating with accompany nausea, vomiting and diarrhea onset of 2 days ago. Patient says about 2 days ago she started having nausea, vomiting, and diarrhea and the diarrhea stopped yesterday and she started having abdominal bloating and she is tender int her right lower quadrant and also in the epigastric area. After discussing problem with Dr, patient complains of coffee-ground emesis.     Plan   IV Fluids  EGD in am

## 2023-10-23 NOTE — HOSPITAL COURSE
10/23/2023 h/o bleeding ulcers in the past, admitted with nausea, vomting and bloating some diarrhea, none since admit.  NM bleeding scan doen this am, CT abd showed enteritis.  K is 3.4 this am.  She is NPO for possible EGD today.  10/24/2023 pt abnormal US HIDA scan ordered surgery planning for EGD, pt reports pain is less today.  10/25/2023  HIDA scan EF 60+% and EGD pending this am  Addendum: pt had EGD this am shows small hiatal hernia, she is feeling overall better, H&H was stable and she is ready for d/c home.  Will arrange PCP f/u as well as with Dr. Byers

## 2023-10-24 ENCOUNTER — ANESTHESIA EVENT (OUTPATIENT)
Dept: GASTROENTEROLOGY | Facility: HOSPITAL | Age: 58
DRG: 392 | End: 2023-10-24
Payer: MEDICAID

## 2023-10-24 ENCOUNTER — ANESTHESIA (OUTPATIENT)
Dept: GASTROENTEROLOGY | Facility: HOSPITAL | Age: 58
DRG: 392 | End: 2023-10-24
Payer: MEDICAID

## 2023-10-24 LAB
ABS NEUT CALC (OHS): 1.49 X10(3)/MCL (ref 2.1–9.2)
ALBUMIN SERPL-MCNC: 2.8 G/DL (ref 3.4–5)
ALBUMIN/GLOB SERPL: 1.3 RATIO
ALP SERPL-CCNC: 78 UNIT/L (ref 50–144)
ALT SERPL-CCNC: 25 UNIT/L (ref 1–45)
ANION GAP SERPL CALC-SCNC: 5 MEQ/L (ref 2–13)
AST SERPL-CCNC: 29 UNIT/L (ref 14–36)
BILIRUB SERPL-MCNC: 0.5 MG/DL (ref 0–1)
BUN SERPL-MCNC: 6 MG/DL (ref 7–20)
CALCIUM SERPL-MCNC: 8.3 MG/DL (ref 8.4–10.2)
CHLORIDE SERPL-SCNC: 111 MMOL/L (ref 98–110)
CO2 SERPL-SCNC: 25 MMOL/L (ref 21–32)
COLOR STL: NORMAL
CONSISTENCY STL: NORMAL
CREAT SERPL-MCNC: 0.96 MG/DL (ref 0.66–1.25)
CREAT/UREA NIT SERPL: 6 (ref 12–20)
EOSINOPHIL NFR BLD MANUAL: 0.07 X10(3)/MCL (ref 0–0.9)
EOSINOPHIL NFR BLD MANUAL: 2 % (ref 0–3)
ERYTHROCYTE [DISTWIDTH] IN BLOOD BY AUTOMATED COUNT: 12.3 % (ref 11–14.5)
GFR SERPLBLD CREATININE-BSD FMLA CKD-EPI: 70 MLS/MIN/1.73/M2
GLOBULIN SER-MCNC: 2.2 GM/DL (ref 2–3.9)
GLUCOSE SERPL-MCNC: 90 MG/DL (ref 70–115)
HCT VFR BLD AUTO: 31.8 % (ref 36–48)
HEMOCCULT SP1 STL QL: NEGATIVE
HGB BLD-MCNC: 10.2 G/DL (ref 11.8–16)
LYMPH ABN # BLD MANUAL: 4 %
LYMPHOCYTES NFR BLD MANUAL: 1.63 X10(3)/MCL
LYMPHOCYTES NFR BLD MANUAL: 47 % (ref 20–55)
MCH RBC QN AUTO: 30 PG (ref 27–34)
MCHC RBC AUTO-ENTMCNC: 32.1 G/DL (ref 31–37)
MCV RBC AUTO: 93.5 FL (ref 79–99)
MONOCYTES NFR BLD MANUAL: 0.14 X10(3)/MCL (ref 0.1–1.3)
MONOCYTES NFR BLD MANUAL: 4 % (ref 0–10)
NEUTROPHILS NFR BLD MANUAL: 41 % (ref 37–73)
NEUTS BAND NFR BLD MANUAL: 2 % (ref 0–5)
NRBC BLD AUTO-RTO: 0 %
PLATELET # BLD AUTO: 244 X10(3)/MCL (ref 140–371)
PLATELET # BLD EST: ADEQUATE 10*3/UL
PMV BLD AUTO: 9.2 FL (ref 9.4–12.4)
POTASSIUM SERPL-SCNC: 3.6 MMOL/L (ref 3.5–5.1)
PROT SERPL-MCNC: 5 GM/DL (ref 6.3–8.2)
RBC # BLD AUTO: 3.4 X10(6)/MCL (ref 4–5.1)
SODIUM SERPL-SCNC: 141 MMOL/L (ref 135–145)
WBC # SPEC AUTO: 3.46 X10(3)/MCL (ref 4–11.5)

## 2023-10-24 PROCEDURE — 11000001 HC ACUTE MED/SURG PRIVATE ROOM

## 2023-10-24 PROCEDURE — 63600175 PHARM REV CODE 636 W HCPCS: Performed by: INTERNAL MEDICINE

## 2023-10-24 PROCEDURE — 87338 HPYLORI STOOL AG IA: CPT | Performed by: FAMILY MEDICINE

## 2023-10-24 PROCEDURE — 63600175 PHARM REV CODE 636 W HCPCS: Performed by: FAMILY MEDICINE

## 2023-10-24 PROCEDURE — 82270 OCCULT BLOOD FECES: CPT | Performed by: SURGERY

## 2023-10-24 PROCEDURE — 25000003 PHARM REV CODE 250: Performed by: FAMILY MEDICINE

## 2023-10-24 PROCEDURE — 85027 COMPLETE CBC AUTOMATED: CPT | Performed by: FAMILY MEDICINE

## 2023-10-24 PROCEDURE — C9113 INJ PANTOPRAZOLE SODIUM, VIA: HCPCS | Performed by: INTERNAL MEDICINE

## 2023-10-24 PROCEDURE — 94761 N-INVAS EAR/PLS OXIMETRY MLT: CPT

## 2023-10-24 PROCEDURE — 36415 COLL VENOUS BLD VENIPUNCTURE: CPT | Performed by: FAMILY MEDICINE

## 2023-10-24 PROCEDURE — 25000003 PHARM REV CODE 250: Performed by: INTERNAL MEDICINE

## 2023-10-24 PROCEDURE — 80053 COMPREHEN METABOLIC PANEL: CPT | Performed by: FAMILY MEDICINE

## 2023-10-24 RX ORDER — SINCALIDE 5 UG/5ML
0.02 INJECTION, POWDER, LYOPHILIZED, FOR SOLUTION INTRAVENOUS ONCE
Status: COMPLETED | OUTPATIENT
Start: 2023-10-24 | End: 2023-10-24

## 2023-10-24 RX ORDER — GLYCOPYRROLATE 0.2 MG/ML
0.1 INJECTION INTRAMUSCULAR; INTRAVENOUS ONCE
Status: DISCONTINUED | OUTPATIENT
Start: 2023-10-24 | End: 2023-10-25 | Stop reason: HOSPADM

## 2023-10-24 RX ADMIN — SUCRALFATE 1 G: 1 TABLET ORAL at 09:10

## 2023-10-24 RX ADMIN — SUCRALFATE 1 G: 1 TABLET ORAL at 10:10

## 2023-10-24 RX ADMIN — LISINOPRIL 5 MG: 5 TABLET ORAL at 10:10

## 2023-10-24 RX ADMIN — FAMOTIDINE 20 MG: 20 TABLET ORAL at 10:10

## 2023-10-24 RX ADMIN — SINCALIDE 1.2 MCG: 5 INJECTION, POWDER, LYOPHILIZED, FOR SOLUTION INTRAVENOUS at 12:10

## 2023-10-24 RX ADMIN — FAMOTIDINE 20 MG: 20 TABLET ORAL at 09:10

## 2023-10-24 RX ADMIN — PANTOPRAZOLE SODIUM 40 MG: 40 INJECTION, POWDER, FOR SOLUTION INTRAVENOUS at 10:10

## 2023-10-24 RX ADMIN — METOPROLOL SUCCINATE 25 MG: 25 TABLET, EXTENDED RELEASE ORAL at 10:10

## 2023-10-24 RX ADMIN — PIPERACILLIN AND TAZOBACTAM 4.5 G: 4; .5 INJECTION, POWDER, FOR SOLUTION INTRAVENOUS; PARENTERAL at 01:10

## 2023-10-24 RX ADMIN — ATORVASTATIN CALCIUM 10 MG: 10 TABLET, FILM COATED ORAL at 09:10

## 2023-10-24 RX ADMIN — SODIUM CHLORIDE: 9 INJECTION, SOLUTION INTRAVENOUS at 04:10

## 2023-10-24 RX ADMIN — PIPERACILLIN AND TAZOBACTAM 4.5 G: 4; .5 INJECTION, POWDER, FOR SOLUTION INTRAVENOUS; PARENTERAL at 10:10

## 2023-10-24 RX ADMIN — DULOXETINE HYDROCHLORIDE 60 MG: 30 CAPSULE, DELAYED RELEASE ORAL at 10:10

## 2023-10-24 RX ADMIN — PANTOPRAZOLE SODIUM 40 MG: 40 INJECTION, POWDER, FOR SOLUTION INTRAVENOUS at 09:10

## 2023-10-24 RX ADMIN — SUCRALFATE 1 G: 1 TABLET ORAL at 05:10

## 2023-10-24 RX ADMIN — SODIUM CHLORIDE: 9 INJECTION, SOLUTION INTRAVENOUS at 05:10

## 2023-10-24 RX ADMIN — PIPERACILLIN AND TAZOBACTAM 4.5 G: 4; .5 INJECTION, POWDER, FOR SOLUTION INTRAVENOUS; PARENTERAL at 05:10

## 2023-10-24 RX ADMIN — POTASSIUM CHLORIDE 40 MEQ: 1500 TABLET, EXTENDED RELEASE ORAL at 10:10

## 2023-10-24 NOTE — PROGRESS NOTES
Inpatient Nutrition Evaluation    Admit Date: 10/22/2023   Total duration of encounter: 2 days    Nutrition Recommendation/Prescription     Continue NPO. Once medically appropriate ADAT to [place].    Add Boost Breeze BID (250 kcal, 9 gm pro each).    Change Boost Breeze to Plus once diet advanced.     Dietitian will monitor Energy Intake, Gastrointestinal Profile, Weight, and Weight Change and adjust MNT as needed.       Monitoring & Evaluation     Communication of Recommendations: reviewed with nurse and reviewed with patient    Reason Seen: continuous nutrition monitoring    Nutrition Risk/Follow-Up: Low (follow-up in 5-7 days)  Patients assigned 'Low nutrition risk' status do not qualify for a full nutritional assessment but will be monitored and re-evaluated in a 5-7 day time period. Please consult if re-evaluation needed sooner.    Nutrition Assessment     Chart Review    Malnutrition Screening Tool Results   Have you recently lost weight without trying?: No  Have you been eating poorly because of a decreased appetite?: No   MST Score: 0     Diagnosis:  Intractable N/V, Enteritis.     Past Medical History:   Diagnosis Date    Anxiety and depression     Hyperlipidemia     Hypertension     Insomnia     Known health problems: none     Unspecified osteoarthritis, unspecified site     bilateral knees     Past Surgical History:   Procedure Laterality Date    CARPAL TUNNEL RELEASE       SECTION  1986     SECTION  1988     SECTION  1992     SECTION WITH TUBAL LIGATION  1990       Nutrition-Related Medications: Atorvastatin, Toprol XL, Zosyn, IVF @ 100 ml/hr.     Nutrition-Related Labs:  Lab Results   Component Value Date    WBC 3.46 (L) 10/24/2023    WBC 5.9 2021     Lab Results   Component Value Date    RBC 3.40 (L) 10/24/2023    RBC 3.73 (L) 2021     Lab Results   Component Value Date    HGB 10.2 (L) 10/24/2023    HGB 11.4 (L) 2021     Lab  Results   Component Value Date    HCT 31.8 (L) 10/24/2023    HCT 35.1 (L) 2021     Lab Results   Component Value Date    CHLORIDE 111 (H) 10/24/2023    CHLORIDE 108 (H) 2021     Lab Results   Component Value Date    CO2 25 10/24/2023    CO2 30 (H) 2021     Lab Results   Component Value Date    BUN 6.0 (L) 10/24/2023    BUN 9.0 (L) 2021     Lab Results   Component Value Date    CALCIUM 8.3 (L) 10/24/2023    CALCIUM 9.2 2021     Lab Results   Component Value Date    ALBUMIN 2.8 (L) 10/24/2023    ALBUMIN 3.3 (L) 2021   CrCl:    Lab Value: 52.4    Date: 10/24    Diet Order: Diet NPO  Oral Supplement Order: none  Appetite/Oral Intake: NPO/NPO  Factors Affecting Nutritional Intake: altered gastrointestinal function and NPO  Food/Samaritan/Cultural Preferences:  Dislike: Eggs, Bananas or Apple Juice.  Food Allergies: none reported and no known food allergies       Wound(s):       Overview/Hospital Course:    (10/24): Patient reports poor appetite for about 1 day prior to admit due to N/V. Patient reports good appetite now and very hungry. NPO for EGD per nurse. Patient reports UBW-130# and weighed that on Friday showing 1.08# (0.83%) weight loss over 4 days.     Anthropometrics    Height: 5' (152.4 cm) Height Method: Stated  Last Weight: 58.6 kg (129 lb 3.2 oz) (10/22/23 1619) Weight Method: Standard Scale  BMI (Calculated): 25.2  BMI Classification: overweight (BMI 25-29.9)     Ideal Body Weight (IBW), Female: 100 lb     % Ideal Body Weight, Female (lb): 129.2 %                    Usual Body Weight (UBW), k.09 kg  % Usual Body Weight: 99.39     Usual Weight Provided By: patient and EMR weight history    Wt Readings from Last 5 Encounters:   10/22/23 58.6 kg (129 lb 3.2 oz)   23 58.5 kg (129 lb)   23 57.1 kg (125 lb 12.4 oz)   23 58.1 kg (128 lb)   07/10/23 59 kg (130 lb)     Weight Change(s) Since Admission:  Admit Weight: 58.5 kg (129 lb) (10/22/23  1038)      Patient Education    Not applicable.

## 2023-10-24 NOTE — SUBJECTIVE & OBJECTIVE
Interval History:      Review of Systems   Constitutional:  Negative for activity change, appetite change and fever.   Respiratory:  Negative for chest tightness, shortness of breath and wheezing.    Cardiovascular:  Negative for chest pain.   Gastrointestinal:  Positive for diarrhea, nausea and vomiting. Negative for abdominal pain and constipation.        None since admit   Genitourinary:  Negative for dysuria.   Skin:  Negative for rash and wound.   Neurological:  Negative for tremors and headaches.     Objective:     Vital Signs (Most Recent):  Temp: 96.7 °F (35.9 °C) (10/24/23 1033)  Pulse: 68 (10/24/23 1033)  Resp: 18 (10/24/23 1033)  BP: 131/80 (10/24/23 1033)  SpO2: 98 % (10/24/23 1033) Vital Signs (24h Range):  Temp:  [96.7 °F (35.9 °C)-97.7 °F (36.5 °C)] 96.7 °F (35.9 °C)  Pulse:  [64-71] 68  Resp:  [14-20] 18  SpO2:  [96 %-98 %] 98 %  BP: (110-131)/(63-80) 131/80     Weight: 58.6 kg (129 lb 3.2 oz)  Body mass index is 25.23 kg/m².    Intake/Output Summary (Last 24 hours) at 10/24/2023 1510  Last data filed at 10/24/2023 1036  Gross per 24 hour   Intake 2450 ml   Output 200 ml   Net 2250 ml           Physical Exam  Vitals and nursing note reviewed. Exam conducted with a chaperone present.   Constitutional:       General: She is not in acute distress.     Appearance: Normal appearance. She is normal weight. She is not ill-appearing.   HENT:      Head: Normocephalic and atraumatic.   Eyes:      General: No scleral icterus.     Extraocular Movements: Extraocular movements intact.   Cardiovascular:      Rate and Rhythm: Normal rate and regular rhythm.      Pulses: Normal pulses.      Heart sounds: Normal heart sounds.   Pulmonary:      Effort: Pulmonary effort is normal.      Breath sounds: Normal breath sounds.   Abdominal:      General: Abdomen is flat. Bowel sounds are normal.      Palpations: Abdomen is soft.   Skin:     General: Skin is warm and dry.      Capillary Refill: Capillary refill takes less than  2 seconds.      Findings: No erythema, lesion or rash.   Neurological:      General: No focal deficit present.      Mental Status: She is alert and oriented to person, place, and time.   Psychiatric:         Mood and Affect: Mood normal.         Behavior: Behavior normal.         Thought Content: Thought content normal.             Significant Labs: All pertinent labs within the past 24 hours have been reviewed.  BMP:   Recent Labs   Lab 10/24/23  0716      K 3.6   CO2 25   BUN 6.0*   CREATININE 0.96   CALCIUM 8.3*       CBC:   Recent Labs   Lab 10/23/23  0441 10/24/23  0716   WBC 3.73* 3.46*   HGB 10.1* 10.2*   HCT 30.4* 31.8*    244         Significant Imaging: I have reviewed all pertinent imaging results/findings within the past 24 hours.

## 2023-10-24 NOTE — ASSESSMENT & PLAN NOTE
Continue iv abx  Surgery to evaluate, h/o PUD in the past       Plan: Discussed  PRP treatments Detail Level: Zone

## 2023-10-24 NOTE — PLAN OF CARE
Problem: Adult Inpatient Plan of Care  Goal: Plan of Care Review  Outcome: Ongoing, Progressing  Goal: Absence of Hospital-Acquired Illness or Injury  Outcome: Ongoing, Progressing  Goal: Optimal Comfort and Wellbeing  Outcome: Ongoing, Progressing  Goal: Readiness for Transition of Care  Outcome: Ongoing, Progressing     Problem: Nausea and Vomiting  Goal: Fluid and Electrolyte Balance  Outcome: Ongoing, Progressing     Problem: Pain Acute  Goal: Acceptable Pain Control and Functional Ability  Outcome: Ongoing, Progressing

## 2023-10-24 NOTE — PROGRESS NOTES
"[facility]/Surg  [facility] Medicine  Progress Note    Patient Name: Roya Wills  MRN: [id number]  Patient Class: IP- Inpatient   Admission Date: 10/22/2023  Length of Stay: 1 days  Attending Physician: Venessa Castano MD  Primary Care Provider: Amie Mckeon FNP-C        Subjective:     Principal Problem:Intractable nausea and vomiting        HPI:   Abdominal Pain    Nausea    Diarrhea    Vomiting       Pt c/o abdominal "bloating" with accompany n/v/d onset 2 days pta.           HPI:   58 year old woman with history of anxiety/depression ,THN, hLD presents for nausea, vomiting, bloating for last three days. Patient says that symptoms began Friday and consisted of lots of nausea and diarrhea. Denied symptoms like this before. Denied any recent travel. Denied any recent abx use. Patient says that she has been diagnosed with ulcers in past and symptoms presented similarly.      On further questioning patient complained of dark vomitus and stools. Denies any NSAID use     ED course: CT abdomen showed concerns for colitis. Asked Dr. Case to consult surgery regarding potential EGD and he agreed and said he would consult. Admitted for management of colitis, melena/hematemesis.          Overview/Hospital Course:  10/23/2023 h/o bleeding ulcers in the past, admitted with nausea, vomting and bloating some diarrhea, none since admit.  NM bleeding scan doen this am, CT abd showed enteritis.  K is 3.4 this am.  She is NPO for possible EGD today.  10/24/2023 pt abnormal US HIDA scan ordered surgery planning for EGD, pt reports pain is less today.      Interval History:      Review of Systems   Constitutional:  Negative for activity change, appetite change and fever.   Respiratory:  Negative for chest tightness, shortness of breath and wheezing.    Cardiovascular:  Negative for chest pain.   Gastrointestinal:  Positive for diarrhea, nausea and vomiting. Negative for abdominal pain and constipation.       "  None since admit   Genitourinary:  Negative for dysuria.   Skin:  Negative for rash and wound.   Neurological:  Negative for tremors and headaches.     Objective:     Vital Signs (Most Recent):  Temp: 96.7 °F (35.9 °C) (10/24/23 1033)  Pulse: 68 (10/24/23 1033)  Resp: 18 (10/24/23 1033)  BP: 131/80 (10/24/23 1033)  SpO2: 98 % (10/24/23 1033) Vital Signs (24h Range):  Temp:  [96.7 °F (35.9 °C)-97.7 °F (36.5 °C)] 96.7 °F (35.9 °C)  Pulse:  [64-71] 68  Resp:  [14-20] 18  SpO2:  [96 %-98 %] 98 %  BP: (110-131)/(63-80) 131/80     Weight: 58.6 kg (129 lb 3.2 oz)  Body mass index is 25.23 kg/m².    Intake/Output Summary (Last 24 hours) at 10/24/2023 1510  Last data filed at 10/24/2023 1036  Gross per 24 hour   Intake 2450 ml   Output 200 ml   Net 2250 ml           Physical Exam  Vitals and nursing note reviewed. Exam conducted with a chaperone present.   Constitutional:       General: She is not in acute distress.     Appearance: Normal appearance. She is normal weight. She is not ill-appearing.   HENT:      Head: Normocephalic and atraumatic.   Eyes:      General: No scleral icterus.     Extraocular Movements: Extraocular movements intact.   Cardiovascular:      Rate and Rhythm: Normal rate and regular rhythm.      Pulses: Normal pulses.      Heart sounds: Normal heart sounds.   Pulmonary:      Effort: Pulmonary effort is normal.      Breath sounds: Normal breath sounds.   Abdominal:      General: Abdomen is flat. Bowel sounds are normal.      Palpations: Abdomen is soft.   Skin:     General: Skin is warm and dry.      Capillary Refill: Capillary refill takes less than 2 seconds.      Findings: No erythema, lesion or rash.   Neurological:      General: No focal deficit present.      Mental Status: She is alert and oriented to person, place, and time.   Psychiatric:         Mood and Affect: Mood normal.         Behavior: Behavior normal.         Thought Content: Thought content normal.             Significant Labs: All  pertinent labs within the past 24 hours have been reviewed.  BMP:   Recent Labs   Lab 10/24/23  0716      K 3.6   CO2 25   BUN 6.0*   CREATININE 0.96   CALCIUM 8.3*       CBC:   Recent Labs   Lab 10/23/23  0441 10/24/23  0716   WBC 3.73* 3.46*   HGB 10.1* 10.2*   HCT 30.4* 31.8*    244         Significant Imaging: I have reviewed all pertinent imaging results/findings within the past 24 hours.      Assessment/Plan:      * Intractable nausea and vomiting  imrpoved this am  Tolerating clear liquids  HIDA scan ordered and pending this am      Enteritis  Continue iv abx  Surgery to evaluate, h/o PUD in the past          VTE Risk Mitigation (From admission, onward)         Ordered     Place sequential compression device  Until discontinued         10/22/23 1945     IP VTE LOW RISK PATIENT  Once         10/22/23 1622                Discharge Planning   DORON: 10/26/2023     Code Status: Full Code   Is the patient medically ready for discharge?:     Reason for patient still in hospital (select all that apply): Patient trending condition and Consult recommendations  Discharge Plan A: Home                  Venessa Castano MD  Department of Hospital Medicine   [facility]/S[facility]

## 2023-10-24 NOTE — ANESTHESIA PREPROCEDURE EVALUATION
10/24/2023  Roya Wills is a 58 y.o., female.      Pre-op Assessment    I have reviewed the Patient Summary Reports.     I have reviewed the Nursing Notes. I have reviewed the NPO Status.   I have reviewed the Medications.     Review of Systems  Anesthesia Hx:  No problems with previous Anesthesia  Denies Family Hx of Anesthesia complications.   Denies Personal Hx of Anesthesia complications.   Social:  Smoker    Hematology/Oncology:     Oncology Normal    -- Anemia:   EENT/Dental:EENT/Dental Normal   Cardiovascular:   Exercise tolerance: poor Hypertension    Pulmonary:  Pulmonary Normal    Renal/:  Renal/ Normal     Hepatic/GI:  Hepatic/GI Normal    Musculoskeletal:   Arthritis     Neurological:  Neurology Normal    Endocrine:  Endocrine Normal    Dermatological:  Skin Normal    Psych:   Psychiatric History anxiety depression          Physical Exam  General: Well nourished, Cooperative, Alert and Oriented    Airway:  Mallampati: II / II  Mouth Opening: Normal  TM Distance: Normal  Tongue: Normal  Neck ROM: Normal ROM    Dental:  Intact        Anesthesia Plan  Type of Anesthesia, risks & benefits discussed:    Anesthesia Type: MAC  Intra-op Monitoring Plan: Standard ASA Monitors  Post Op Pain Control Plan: multimodal analgesia  Induction:  IV  Airway Plan: Direct  Informed Consent: Informed consent signed with the Patient and all parties understand the risks and agree with anesthesia plan.  All questions answered. Patient consented to blood products? Yes  ASA Score: 3  Day of Surgery Review of History & Physical: H&P Update referred to the surgeon/provider.I have interviewed and examined the patient. I have reviewed the patient's H&P dated: There are no significant changes.     Ready For Surgery From Anesthesia Perspective.     .

## 2023-10-25 VITALS
RESPIRATION RATE: 16 BRPM | WEIGHT: 135.81 LBS | DIASTOLIC BLOOD PRESSURE: 73 MMHG | OXYGEN SATURATION: 96 % | HEART RATE: 63 BPM | TEMPERATURE: 98 F | BODY MASS INDEX: 26.66 KG/M2 | HEIGHT: 60 IN | SYSTOLIC BLOOD PRESSURE: 128 MMHG

## 2023-10-25 LAB
ALBUMIN SERPL-MCNC: 2.7 G/DL (ref 3.4–5)
ALBUMIN/GLOB SERPL: 1.2 RATIO
ALP SERPL-CCNC: 88 UNIT/L (ref 50–144)
ALT SERPL-CCNC: 32 UNIT/L (ref 1–45)
ANION GAP SERPL CALC-SCNC: 3 MEQ/L (ref 2–13)
AST SERPL-CCNC: 38 UNIT/L (ref 14–36)
B-HCG SERPL QL: NEGATIVE
BASOPHILS # BLD AUTO: 0.02 X10(3)/MCL (ref 0.01–0.08)
BASOPHILS NFR BLD AUTO: 0.5 % (ref 0.1–1.2)
BILIRUB SERPL-MCNC: 0.4 MG/DL (ref 0–1)
BUN SERPL-MCNC: 9 MG/DL (ref 7–20)
CALCIUM SERPL-MCNC: 8.5 MG/DL (ref 8.4–10.2)
CHLORIDE SERPL-SCNC: 113 MMOL/L (ref 98–110)
CO2 SERPL-SCNC: 23 MMOL/L (ref 21–32)
CREAT SERPL-MCNC: 0.96 MG/DL (ref 0.66–1.25)
CREAT/UREA NIT SERPL: 9 (ref 12–20)
EOSINOPHIL # BLD AUTO: 0.12 X10(3)/MCL (ref 0.04–0.36)
EOSINOPHIL NFR BLD AUTO: 2.8 % (ref 0.7–7)
ERYTHROCYTE [DISTWIDTH] IN BLOOD BY AUTOMATED COUNT: 12.1 % (ref 11–14.5)
GFR SERPLBLD CREATININE-BSD FMLA CKD-EPI: 70 MLS/MIN/1.73/M2
GLOBULIN SER-MCNC: 2.2 GM/DL (ref 2–3.9)
GLUCOSE SERPL-MCNC: 87 MG/DL (ref 70–115)
H. PYLORI STOOL: NEGATIVE
HCT VFR BLD AUTO: 29.8 % (ref 36–48)
HGB BLD-MCNC: 10.1 G/DL (ref 11.8–16)
IMM GRANULOCYTES # BLD AUTO: 0.01 X10(3)/MCL (ref 0–0.03)
IMM GRANULOCYTES NFR BLD AUTO: 0.2 % (ref 0–0.5)
LYMPHOCYTES # BLD AUTO: 2.01 X10(3)/MCL (ref 1.16–3.74)
LYMPHOCYTES NFR BLD AUTO: 46.5 % (ref 20–55)
MCH RBC QN AUTO: 30.3 PG (ref 27–34)
MCHC RBC AUTO-ENTMCNC: 33.9 G/DL (ref 31–37)
MCV RBC AUTO: 89.5 FL (ref 79–99)
MONOCYTES # BLD AUTO: 0.33 X10(3)/MCL (ref 0.24–0.36)
MONOCYTES NFR BLD AUTO: 7.6 % (ref 4.7–12.5)
NEUTROPHILS # BLD AUTO: 1.83 X10(3)/MCL (ref 1.56–6.13)
NEUTROPHILS NFR BLD AUTO: 42.4 % (ref 37–73)
NRBC BLD AUTO-RTO: 0 %
PLATELET # BLD AUTO: 262 X10(3)/MCL (ref 140–371)
PMV BLD AUTO: 9.7 FL (ref 9.4–12.4)
POTASSIUM SERPL-SCNC: 4.1 MMOL/L (ref 3.5–5.1)
PROT SERPL-MCNC: 4.9 GM/DL (ref 6.3–8.2)
RBC # BLD AUTO: 3.33 X10(6)/MCL (ref 4–5.1)
SODIUM SERPL-SCNC: 139 MMOL/L (ref 135–145)
WBC # SPEC AUTO: 4.32 X10(3)/MCL (ref 4–11.5)

## 2023-10-25 PROCEDURE — 25000003 PHARM REV CODE 250: Performed by: NURSE ANESTHETIST, CERTIFIED REGISTERED

## 2023-10-25 PROCEDURE — 25000003 PHARM REV CODE 250: Performed by: INTERNAL MEDICINE

## 2023-10-25 PROCEDURE — 85025 COMPLETE CBC W/AUTO DIFF WBC: CPT | Performed by: FAMILY MEDICINE

## 2023-10-25 PROCEDURE — 81025 URINE PREGNANCY TEST: CPT | Performed by: FAMILY MEDICINE

## 2023-10-25 PROCEDURE — 94761 N-INVAS EAR/PLS OXIMETRY MLT: CPT

## 2023-10-25 PROCEDURE — C9113 INJ PANTOPRAZOLE SODIUM, VIA: HCPCS | Performed by: INTERNAL MEDICINE

## 2023-10-25 PROCEDURE — 63600175 PHARM REV CODE 636 W HCPCS: Performed by: NURSE ANESTHETIST, CERTIFIED REGISTERED

## 2023-10-25 PROCEDURE — 25000003 PHARM REV CODE 250: Performed by: FAMILY MEDICINE

## 2023-10-25 PROCEDURE — 43239 EGD BIOPSY SINGLE/MULTIPLE: CPT | Performed by: SURGERY

## 2023-10-25 PROCEDURE — D9220A PRA ANESTHESIA: ICD-10-PCS | Mod: ,,, | Performed by: NURSE ANESTHETIST, CERTIFIED REGISTERED

## 2023-10-25 PROCEDURE — 80053 COMPREHEN METABOLIC PANEL: CPT | Performed by: FAMILY MEDICINE

## 2023-10-25 PROCEDURE — 63600175 PHARM REV CODE 636 W HCPCS: Performed by: INTERNAL MEDICINE

## 2023-10-25 PROCEDURE — D9220A PRA ANESTHESIA: Mod: ,,, | Performed by: NURSE ANESTHETIST, CERTIFIED REGISTERED

## 2023-10-25 PROCEDURE — 36415 COLL VENOUS BLD VENIPUNCTURE: CPT | Performed by: FAMILY MEDICINE

## 2023-10-25 RX ORDER — PROPOFOL 10 MG/ML
VIAL (ML) INTRAVENOUS
Status: DISCONTINUED | OUTPATIENT
Start: 2023-10-25 | End: 2023-10-25

## 2023-10-25 RX ORDER — SUCRALFATE 1 G/1
1 TABLET ORAL
Qty: 120 TABLET | Refills: 1 | Status: SHIPPED | OUTPATIENT
Start: 2023-10-25 | End: 2023-12-24

## 2023-10-25 RX ORDER — LIDOCAINE HYDROCHLORIDE 20 MG/ML
INJECTION INTRAVENOUS
Status: DISCONTINUED | OUTPATIENT
Start: 2023-10-25 | End: 2023-10-25

## 2023-10-25 RX ORDER — SODIUM CHLORIDE 9 MG/ML
INJECTION, SOLUTION INTRAVENOUS CONTINUOUS
Status: DISCONTINUED | OUTPATIENT
Start: 2023-10-25 | End: 2023-10-25 | Stop reason: HOSPADM

## 2023-10-25 RX ORDER — PANTOPRAZOLE SODIUM 40 MG/1
40 TABLET, DELAYED RELEASE ORAL DAILY
Qty: 30 TABLET | Refills: 1 | Status: SHIPPED | OUTPATIENT
Start: 2023-10-25 | End: 2024-04-01

## 2023-10-25 RX ORDER — FAMOTIDINE 20 MG/1
20 TABLET, FILM COATED ORAL 2 TIMES DAILY
Qty: 60 TABLET | Refills: 11 | Status: SHIPPED | OUTPATIENT
Start: 2023-10-25 | End: 2024-10-24

## 2023-10-25 RX ADMIN — LIDOCAINE HYDROCHLORIDE 50 MG: 20 INJECTION, SOLUTION INTRAVENOUS at 10:10

## 2023-10-25 RX ADMIN — LISINOPRIL 5 MG: 5 TABLET ORAL at 10:10

## 2023-10-25 RX ADMIN — FAMOTIDINE 20 MG: 20 TABLET ORAL at 10:10

## 2023-10-25 RX ADMIN — METOPROLOL SUCCINATE 25 MG: 25 TABLET, EXTENDED RELEASE ORAL at 10:10

## 2023-10-25 RX ADMIN — PIPERACILLIN AND TAZOBACTAM 4.5 G: 4; .5 INJECTION, POWDER, FOR SOLUTION INTRAVENOUS; PARENTERAL at 12:10

## 2023-10-25 RX ADMIN — PROPOFOL 70 MG: 10 INJECTION, EMULSION INTRAVENOUS at 10:10

## 2023-10-25 RX ADMIN — DULOXETINE HYDROCHLORIDE 60 MG: 30 CAPSULE, DELAYED RELEASE ORAL at 10:10

## 2023-10-25 RX ADMIN — SODIUM CHLORIDE: 9 INJECTION, SOLUTION INTRAVENOUS at 05:10

## 2023-10-25 RX ADMIN — SUCRALFATE 1 G: 1 TABLET ORAL at 05:10

## 2023-10-25 RX ADMIN — PANTOPRAZOLE SODIUM 40 MG: 40 INJECTION, POWDER, FOR SOLUTION INTRAVENOUS at 10:10

## 2023-10-25 RX ADMIN — SUCRALFATE 1 G: 1 TABLET ORAL at 11:10

## 2023-10-25 RX ADMIN — PIPERACILLIN AND TAZOBACTAM 4.5 G: 4; .5 INJECTION, POWDER, FOR SOLUTION INTRAVENOUS; PARENTERAL at 10:10

## 2023-10-25 NOTE — PLAN OF CARE
10/25/23 1339   Final Note   Assessment Type Final Discharge Note   Anticipated Discharge Disposition Home   What phone number can be called within the next 1-3 days to see how you are doing after discharge? [phone]   Post-Acute Status   Discharge Delays None known at this time

## 2023-10-25 NOTE — ANESTHESIA POSTPROCEDURE EVALUATION
Anesthesia Post Evaluation    Patient: Roya Wills    Procedure(s) Performed: * No procedures listed *    Final Anesthesia Type: MAC      Patient location during evaluation: GI PACU  Patient participation: Yes- Able to Participate  Level of consciousness: awake and alert, awake and oriented  Post-procedure vital signs: reviewed and stable  Pain management: adequate  Airway patency: patent    PONV status at discharge: No PONV  Anesthetic complications: no      Cardiovascular status: blood pressure returned to baseline  Respiratory status: unassisted, room air and spontaneous ventilation  Hydration status: euvolemic  Follow-up not needed.          Vitals Value Taken Time   /73 10/25/23 1001   Temp 36.6 °C (97.8 °F) 10/25/23 0301   Pulse 63 10/25/23 1001   Resp 16 10/25/23 0759   SpO2 93 % 10/25/23 0759         No case tracking events are documented in the log.      Pain/Chiqui Score: No data recorded

## 2023-10-25 NOTE — NURSING
Pt discharged ambulating accompanied by family member, refused wheelchair, pt in stable condition and denies further needs at this time.

## 2023-10-25 NOTE — INTERVAL H&P NOTE
The patient has been examined and the H&P has been reviewed:    I concur with the findings and no changes have occurred since H&P was written.        Active Hospital Problems    Diagnosis  POA    *Intractable nausea and vomiting [R11.2]  Yes    Enteritis [K52.9]  Yes      Resolved Hospital Problems   No resolved problems to display.

## 2023-10-25 NOTE — DISCHARGE SUMMARY
"[facility]/Surg  [facility] Medicine  Discharge Summary      Patient Name: oRya Wills  MRN: [id number]  CLAUDIA: [id number]  Patient Class: IP- Inpatient  Admission Date: 10/22/2023  Hospital Length of Stay: 2 days  Discharge Date and Time:  10/25/2023 1:27 PM  Attending Physician: Venessa Castano MD   Discharging Provider: Venessa Castano MD  Primary Care Provider: Amie Mckeon FNP-C    Primary Care Team: Networked reference to record PCT     HPI:    Abdominal Pain    Nausea    Diarrhea    Vomiting       Pt c/o abdominal "bloating" with accompany n/v/d onset 2 days pta.           HPI:   58 year old woman with history of anxiety/depression ,THN, hLD presents for nausea, vomiting, bloating for last three days. Patient says that symptoms began Friday and consisted of lots of nausea and diarrhea. Denied symptoms like this before. Denied any recent travel. Denied any recent abx use. Patient says that she has been diagnosed with ulcers in past and symptoms presented similarly.      On further questioning patient complained of dark vomitus and stools. Denies any NSAID use     ED course: CT abdomen showed concerns for colitis. Asked Dr. Case to consult surgery regarding potential EGD and he agreed and said he would consult. Admitted for management of colitis, melena/hematemesis.          * No surgery found *      Hospital Course:   10/23/2023 h/o bleeding ulcers in the past, admitted with nausea, vomting and bloating some diarrhea, none since admit.  NM bleeding scan doen this am, CT abd showed enteritis.  K is 3.4 this am.  She is NPO for possible EGD today.  10/24/2023 pt abnormal US HIDA scan ordered surgery planning for EGD, pt reports pain is less today.  10/25/2023  HIDA scan EF 60+% and EGD pending this am  Addendum: pt had EGD this am shows small hiatal hernia, she is feeling overall better, H&H was stable and she is ready for d/c home.  Will arrange PCP f/u as well as with Dr. Byers   "     Goals of Care Treatment Preferences:  Code Status: Full Code      Consults:   Consults (From admission, onward)        Status Ordering Provider     Inpatient consult to General surgery  Once        Provider:  Arron Byers MD    Acknowledged TUYET GONZALES          No new Assessment & Plan notes have been filed under this hospital service since the last note was generated.  Service: Hospital Medicine    Final Active Diagnoses:    Diagnosis Date Noted POA    PRINCIPAL PROBLEM:  Intractable nausea and vomiting [R11.2] 10/22/2023 Yes    Enteritis [K52.9] 10/22/2023 Yes      Problems Resolved During this Admission:       Discharged Condition: fair    Disposition: Home or Self Care    Follow Up:   Follow-up Information     Amie Mckeon FNP-C Follow up.    Specialty: Family Medicine  Contact information:  [address]  [facility] [address] [phone]             Arron Byers MD Follow up.    Specialties: General Surgery, Cardiology  Contact information:  [address] [facility] [address] [facility]  [address]  [facility] [id number] [phone]                       Patient Instructions:      Diet general     Activity as tolerated       Significant Diagnostic Studies: Labs:   BMP:   Recent Labs   Lab 10/24/23  0716 10/25/23  0451    139   K 3.6 4.1   CO2 25 23   BUN 6.0* 9.0   CREATININE 0.96 0.96   CALCIUM 8.3* 8.5    and CMP   Recent Labs   Lab 10/24/23  0716 10/25/23  0451    139   K 3.6 4.1   CO2 25 23   BUN 6.0* 9.0   CREATININE 0.96 0.96   CALCIUM 8.3* 8.5   ALBUMIN 2.8* 2.7*   BILITOT 0.5 0.4   ALKPHOS 78 88   AST 29 38*   ALT 25 32       Pending Diagnostic Studies:     Procedure Component Value Units Date/Time    OCCULT BLOOD STOOL, CA SCREEN 2ND SPEC [[id number]]     Order Status: Sent Lab Status: No result     Specimen: Stool     OCCULT BLOOD STOOL, CA SCREEN 3RD SPEC [[id number]]     Order Status: Sent Lab Status: No result     Specimen: Stool     Occult Blood, Stool Screening (1 -3) [[id number]]  Collected: 10/24/23 1658    Order Status: Sent Lab Status: In process Updated: 10/24/23 1738    Specimen: Stool     Narrative:      The following orders were created for panel order Occult Blood, Stool Screening (1 -3).  Procedure                               Abnormality         Status                     ---------                               -----------         ------                     Occult Blood Stool, CA ...[[id number]]                      Final result               OCCULT BLOOD STOOL, CA ...[[id number]]                                                 OCCULT BLOOD STOOL, CA ...[[id number]]                                                   Please view results for these tests on the individual orders.    Specimen to Pathology [[id number]] Collected: 10/25/23 1020    Order Status: Sent Lab Status: In process Updated: 10/25/23 1043    Specimen: Tissue from Antrum     Specimen to Pathology Gastrointestinal tract [[id number]] Collected: 10/25/23 1026    Order Status: Sent Lab Status: In process Updated: 10/25/23 1043    Specimen: Tissue          Medications:  Reconciled Home Medications:      Medication List      START taking these medications    famotidine 20 MG tablet  Commonly known as: PEPCID  Take 1 tablet (20 mg total) by mouth 2 (two) times daily.     pantoprazole 40 MG tablet  Commonly known as: PROTONIX  Take 1 tablet (40 mg total) by mouth once daily.     sucralfate 1 gram tablet  Commonly known as: CARAFATE  Take 1 tablet (1 g total) by mouth 4 (four) times daily before meals and nightly.        CONTINUE taking these medications    atorvastatin 10 MG tablet  Commonly known as: LIPITOR  Take 10 mg by mouth.     DULoxetine 60 MG capsule  Commonly known as: CYMBALTA  Take 60 mg by mouth.     lisinopriL 5 MG tablet  Commonly known as: PRINIVIL,ZESTRIL     metoprolol succinate 25 MG 24 hr tablet  Commonly known as: TOPROL-XL  Take 25 mg by mouth.     QUEtiapine 400 MG tablet  Commonly known as:  SEROQUEL  Take 400 mg by mouth once daily.            Indwelling Lines/Drains at time of discharge:   Lines/Drains/Airways     None                 Time spent on the discharge of patient: 36 minutes     Physical Exam  Constitutional:       General: She is not in acute distress.     Appearance: Normal appearance. She is normal weight. She is not ill-appearing.   Cardiovascular:      Rate and Rhythm: Normal rate and regular rhythm.      Pulses: Normal pulses.      Heart sounds: Normal heart sounds.   Pulmonary:      Effort: Pulmonary effort is normal.      Breath sounds: Normal breath sounds.   Abdominal:      General: Abdomen is flat.      Palpations: Abdomen is soft.   Skin:     General: Skin is warm and dry.      Findings: No erythema, lesion or rash.   Neurological:      Mental Status: She is alert.   Psychiatric:         Behavior: Behavior normal.      Comments: I had a face to face encounter with this patient prior to d/c         Venessa Castano MD  Department of Hospital Medicine  [facility]urg

## 2023-10-25 NOTE — PROGRESS NOTES
"[facility]/Surg  [facility] Medicine  Progress Note    Patient Name: Roya Wills  MRN: [id number]  Patient Class: IP- Inpatient   Admission Date: 10/22/2023  Length of Stay: 2 days  Attending Physician: Venessa Castano MD  Primary Care Provider: Amie Mckeon FNP-C        Subjective:     Principal Problem:Intractable nausea and vomiting        HPI:   Abdominal Pain    Nausea    Diarrhea    Vomiting       Pt c/o abdominal "bloating" with accompany n/v/d onset 2 days pta.           HPI:   58 year old woman with history of anxiety/depression ,THN, hLD presents for nausea, vomiting, bloating for last three days. Patient says that symptoms began Friday and consisted of lots of nausea and diarrhea. Denied symptoms like this before. Denied any recent travel. Denied any recent abx use. Patient says that she has been diagnosed with ulcers in past and symptoms presented similarly.      On further questioning patient complained of dark vomitus and stools. Denies any NSAID use     ED course: CT abdomen showed concerns for colitis. Asked Dr. Case to consult surgery regarding potential EGD and he agreed and said he would consult. Admitted for management of colitis, melena/hematemesis.          Overview/Hospital Course:  10/23/2023 h/o bleeding ulcers in the past, admitted with nausea, vomting and bloating some diarrhea, none since admit.  NM bleeding scan doen this am, CT abd showed enteritis.  K is 3.4 this am.  She is NPO for possible EGD today.  10/24/2023 pt abnormal US HIDA scan ordered surgery planning for EGD, pt reports pain is less today.  10/25/2023  HIDA scan EF 60+% and EGD pending this am      Interval History:      Review of Systems   Constitutional:  Negative for activity change, appetite change and fever.   Respiratory:  Negative for chest tightness, shortness of breath and wheezing.    Cardiovascular:  Negative for chest pain.   Gastrointestinal:  Positive for diarrhea, nausea and " vomiting. Negative for abdominal pain and constipation.        None since admit   Genitourinary:  Negative for dysuria.   Skin:  Negative for rash and wound.   Neurological:  Negative for tremors and headaches.     Objective:     Vital Signs (Most Recent):  Temp: 97.7 °F (36.5 °C) (10/25/23 0800)  Pulse: 63 (10/25/23 1001)  Resp: 16 (10/25/23 0800)  BP: 128/73 (10/25/23 1001)  SpO2: 96 % (10/25/23 0800) Vital Signs (24h Range):  Temp:  [97.3 °F (36.3 °C)-98.3 °F (36.8 °C)] 97.7 °F (36.5 °C)  Pulse:  [61-70] 63  Resp:  [14-20] 16  SpO2:  [93 %-98 %] 96 %  BP: (122-134)/(68-81) 128/73     Weight: 61.6 kg (135 lb 12.9 oz)  Body mass index is 26.52 kg/m².    Intake/Output Summary (Last 24 hours) at 10/25/2023 1220  Last data filed at 10/25/2023 0632  Gross per 24 hour   Intake 3092 ml   Output --   Net 3092 ml           Physical Exam  Vitals and nursing note reviewed. Exam conducted with a chaperone present.   Constitutional:       General: She is not in acute distress.     Appearance: Normal appearance. She is normal weight. She is not ill-appearing.   HENT:      Head: Normocephalic and atraumatic.   Eyes:      General: No scleral icterus.     Extraocular Movements: Extraocular movements intact.   Cardiovascular:      Rate and Rhythm: Normal rate and regular rhythm.      Pulses: Normal pulses.      Heart sounds: Normal heart sounds.   Pulmonary:      Effort: Pulmonary effort is normal.      Breath sounds: Normal breath sounds.   Abdominal:      General: Abdomen is flat. Bowel sounds are normal.      Palpations: Abdomen is soft.   Skin:     General: Skin is warm and dry.      Capillary Refill: Capillary refill takes less than 2 seconds.      Findings: No erythema, lesion or rash.   Neurological:      General: No focal deficit present.      Mental Status: She is alert and oriented to person, place, and time.   Psychiatric:         Mood and Affect: Mood normal.         Behavior: Behavior normal.         Thought Content:  Thought content normal.             Significant Labs: All pertinent labs within the past 24 hours have been reviewed.  BMP:   Recent Labs   Lab 10/25/23  0451      K 4.1   CO2 23   BUN 9.0   CREATININE 0.96   CALCIUM 8.5       CBC:   Recent Labs   Lab 10/24/23  0716 10/25/23  0451   WBC 3.46* 4.32   HGB 10.2* 10.1*   HCT 31.8* 29.8*    262         Significant Imaging: I have reviewed all pertinent imaging results/findings within the past 24 hours.      Assessment/Plan:      * Intractable nausea and vomiting  imrpoved this am  Tolerating clear liquids  HIDA scan ordered and pending this am  EGD this am      Enteritis  Continue iv abx  Surgery to evaluate, h/o PUD in the past          VTE Risk Mitigation (From admission, onward)         Ordered     Place sequential compression device  Until discontinued         10/22/23 1945     IP VTE LOW RISK PATIENT  Once         10/22/23 1622                Discharge Planning   DORON: 10/25/2023     Code Status: Full Code   Is the patient medically ready for discharge?:     Reason for patient still in hospital (select all that apply): Treatment, Imaging and Consult recommendations  Discharge Plan A: Home                  Venessa Castano MD  Department of Hospital Medicine   [facility]

## 2023-10-25 NOTE — SUBJECTIVE & OBJECTIVE
Interval History:      Review of Systems   Constitutional:  Negative for activity change, appetite change and fever.   Respiratory:  Negative for chest tightness, shortness of breath and wheezing.    Cardiovascular:  Negative for chest pain.   Gastrointestinal:  Positive for diarrhea, nausea and vomiting. Negative for abdominal pain and constipation.        None since admit   Genitourinary:  Negative for dysuria.   Skin:  Negative for rash and wound.   Neurological:  Negative for tremors and headaches.     Objective:     Vital Signs (Most Recent):  Temp: 97.7 °F (36.5 °C) (10/25/23 0800)  Pulse: 63 (10/25/23 1001)  Resp: 16 (10/25/23 0800)  BP: 128/73 (10/25/23 1001)  SpO2: 96 % (10/25/23 0800) Vital Signs (24h Range):  Temp:  [97.3 °F (36.3 °C)-98.3 °F (36.8 °C)] 97.7 °F (36.5 °C)  Pulse:  [61-70] 63  Resp:  [14-20] 16  SpO2:  [93 %-98 %] 96 %  BP: (122-134)/(68-81) 128/73     Weight: 61.6 kg (135 lb 12.9 oz)  Body mass index is 26.52 kg/m².    Intake/Output Summary (Last 24 hours) at 10/25/2023 1220  Last data filed at 10/25/2023 0632  Gross per 24 hour   Intake 3092 ml   Output --   Net 3092 ml           Physical Exam  Vitals and nursing note reviewed. Exam conducted with a chaperone present.   Constitutional:       General: She is not in acute distress.     Appearance: Normal appearance. She is normal weight. She is not ill-appearing.   HENT:      Head: Normocephalic and atraumatic.   Eyes:      General: No scleral icterus.     Extraocular Movements: Extraocular movements intact.   Cardiovascular:      Rate and Rhythm: Normal rate and regular rhythm.      Pulses: Normal pulses.      Heart sounds: Normal heart sounds.   Pulmonary:      Effort: Pulmonary effort is normal.      Breath sounds: Normal breath sounds.   Abdominal:      General: Abdomen is flat. Bowel sounds are normal.      Palpations: Abdomen is soft.   Skin:     General: Skin is warm and dry.      Capillary Refill: Capillary refill takes less than 2  seconds.      Findings: No erythema, lesion or rash.   Neurological:      General: No focal deficit present.      Mental Status: She is alert and oriented to person, place, and time.   Psychiatric:         Mood and Affect: Mood normal.         Behavior: Behavior normal.         Thought Content: Thought content normal.             Significant Labs: All pertinent labs within the past 24 hours have been reviewed.  BMP:   Recent Labs   Lab 10/25/23  0451      K 4.1   CO2 23   BUN 9.0   CREATININE 0.96   CALCIUM 8.5       CBC:   Recent Labs   Lab 10/24/23  0716 10/25/23  0451   WBC 3.46* 4.32   HGB 10.2* 10.1*   HCT 31.8* 29.8*    262         Significant Imaging: I have reviewed all pertinent imaging results/findings within the past 24 hours.

## 2023-10-25 NOTE — PLAN OF CARE
Problem: Adult Inpatient Plan of Care  Goal: Plan of Care Review  Outcome: Ongoing, Progressing  Goal: Patient-Specific Goal (Individualized)  Outcome: Ongoing, Progressing  Goal: Absence of Hospital-Acquired Illness or Injury  Outcome: Ongoing, Progressing  Goal: Optimal Comfort and Wellbeing  Outcome: Ongoing, Progressing  Goal: Readiness for Transition of Care  Outcome: Ongoing, Progressing     Problem: Nausea and Vomiting  Goal: Fluid and Electrolyte Balance  Outcome: Ongoing, Progressing     Problem: Pain Acute  Goal: Acceptable Pain Control and Functional Ability  Outcome: Ongoing, Progressing     Problem: Fall Injury Risk  Goal: Absence of Fall and Fall-Related Injury  Outcome: Ongoing, Progressing

## 2023-11-07 ENCOUNTER — HOSPITAL ENCOUNTER (OUTPATIENT)
Dept: RADIOLOGY | Facility: HOSPITAL | Age: 58
Discharge: HOME | End: 2023-11-07
Attending: NURSE PRACTITIONER
Payer: MEDICAID

## 2023-11-07 DIAGNOSIS — Z12.31 ENCOUNTER FOR SCREENING MAMMOGRAM FOR MALIGNANT NEOPLASM OF BREAST: ICD-10-CM

## 2023-11-07 PROCEDURE — 77067 SCR MAMMO BI INCL CAD: CPT | Mod: TC

## 2024-01-25 ENCOUNTER — HOSPITAL ENCOUNTER (EMERGENCY)
Facility: HOSPITAL | Age: 59
Discharge: HOME | End: 2024-01-25
Payer: MEDICAID

## 2024-01-25 VITALS
SYSTOLIC BLOOD PRESSURE: 123 MMHG | HEART RATE: 81 BPM | WEIGHT: 130 LBS | OXYGEN SATURATION: 98 % | DIASTOLIC BLOOD PRESSURE: 80 MMHG | TEMPERATURE: 99 F | HEIGHT: 60 IN | BODY MASS INDEX: 25.52 KG/M2 | RESPIRATION RATE: 18 BRPM

## 2024-01-25 DIAGNOSIS — R42 DIZZINESS: ICD-10-CM

## 2024-01-25 DIAGNOSIS — E87.6 HYPOKALEMIA: Primary | ICD-10-CM

## 2024-01-25 DIAGNOSIS — R00.2 PALPITATIONS: ICD-10-CM

## 2024-01-25 LAB
ALBUMIN SERPL-MCNC: 3.8 G/DL (ref 3.4–5)
ALBUMIN/GLOB SERPL: 1.2 RATIO
ALP SERPL-CCNC: 137 UNIT/L (ref 50–144)
ALT SERPL-CCNC: 31 UNIT/L (ref 1–45)
ANION GAP SERPL CALC-SCNC: 3 MEQ/L (ref 2–13)
APPEARANCE UR: CLEAR
AST SERPL-CCNC: 28 UNIT/L (ref 14–36)
BASOPHILS # BLD AUTO: 0.03 X10(3)/MCL (ref 0.01–0.08)
BASOPHILS NFR BLD AUTO: 0.4 % (ref 0.1–1.2)
BILIRUB SERPL-MCNC: 0.2 MG/DL (ref 0–1)
BILIRUB UR QL STRIP.AUTO: NEGATIVE
BUN SERPL-MCNC: 8 MG/DL (ref 7–20)
CALCIUM SERPL-MCNC: 9.1 MG/DL (ref 8.4–10.2)
CHLORIDE SERPL-SCNC: 106 MMOL/L (ref 98–110)
CO2 SERPL-SCNC: 31 MMOL/L (ref 21–32)
COLOR UR AUTO: YELLOW
CREAT SERPL-MCNC: 0.86 MG/DL (ref 0.66–1.25)
CREAT/UREA NIT SERPL: 9 (ref 12–20)
EOSINOPHIL # BLD AUTO: 0.13 X10(3)/MCL (ref 0.04–0.36)
EOSINOPHIL NFR BLD AUTO: 1.9 % (ref 0.7–7)
ERYTHROCYTE [DISTWIDTH] IN BLOOD BY AUTOMATED COUNT: 12.5 % (ref 11–14.5)
GFR SERPLBLD CREATININE-BSD FMLA CKD-EPI: 79 MLS/MIN/1.73/M2
GLOBULIN SER-MCNC: 3.1 GM/DL (ref 2–3.9)
GLUCOSE SERPL-MCNC: 96 MG/DL (ref 70–115)
GLUCOSE UR QL STRIP.AUTO: NEGATIVE
HCT VFR BLD AUTO: 40.7 % (ref 36–48)
HGB BLD-MCNC: 13.4 G/DL (ref 11.8–16)
IMM GRANULOCYTES # BLD AUTO: 0.04 X10(3)/MCL (ref 0–0.03)
IMM GRANULOCYTES NFR BLD AUTO: 0.6 % (ref 0–0.5)
KETONES UR QL STRIP.AUTO: NEGATIVE
LEUKOCYTE ESTERASE UR QL STRIP.AUTO: NEGATIVE
LIPASE SERPL-CCNC: 60 U/L (ref 23–300)
LYMPHOCYTES # BLD AUTO: 2.47 X10(3)/MCL (ref 1.16–3.74)
LYMPHOCYTES NFR BLD AUTO: 36.8 % (ref 20–55)
MCH RBC QN AUTO: 30 PG (ref 27–34)
MCHC RBC AUTO-ENTMCNC: 32.9 G/DL (ref 31–37)
MCV RBC AUTO: 91.1 FL (ref 79–99)
MONOCYTES # BLD AUTO: 0.51 X10(3)/MCL (ref 0.24–0.36)
MONOCYTES NFR BLD AUTO: 7.6 % (ref 4.7–12.5)
NEUTROPHILS # BLD AUTO: 3.54 X10(3)/MCL (ref 1.56–6.13)
NEUTROPHILS NFR BLD AUTO: 52.7 % (ref 37–73)
NITRITE UR QL STRIP.AUTO: NEGATIVE
NRBC BLD AUTO-RTO: 0 %
PH UR STRIP.AUTO: 7 [PH]
PLATELET # BLD AUTO: 360 X10(3)/MCL (ref 140–371)
PMV BLD AUTO: 9.1 FL (ref 9.4–12.4)
POCT GLUCOSE: 97 MG/DL (ref 70–110)
POTASSIUM SERPL-SCNC: 3.2 MMOL/L (ref 3.5–5.1)
PROT SERPL-MCNC: 6.9 GM/DL (ref 6.3–8.2)
PROT UR QL STRIP.AUTO: NEGATIVE
RBC # BLD AUTO: 4.47 X10(6)/MCL (ref 4–5.1)
RBC UR QL AUTO: NEGATIVE
SODIUM SERPL-SCNC: 140 MMOL/L (ref 135–145)
SP GR UR STRIP.AUTO: 1.01 (ref 1–1.03)
TROPONIN I SERPL-MCNC: <0.012 NG/ML (ref 0–0.03)
UROBILINOGEN UR STRIP-ACNC: 0.2
WBC # SPEC AUTO: 6.72 X10(3)/MCL (ref 4–11.5)

## 2024-01-25 PROCEDURE — 84484 ASSAY OF TROPONIN QUANT: CPT | Performed by: NURSE PRACTITIONER

## 2024-01-25 PROCEDURE — 82962 GLUCOSE BLOOD TEST: CPT

## 2024-01-25 PROCEDURE — 85025 COMPLETE CBC W/AUTO DIFF WBC: CPT | Performed by: NURSE PRACTITIONER

## 2024-01-25 PROCEDURE — 80053 COMPREHEN METABOLIC PANEL: CPT | Performed by: NURSE PRACTITIONER

## 2024-01-25 PROCEDURE — 83690 ASSAY OF LIPASE: CPT | Performed by: NURSE PRACTITIONER

## 2024-01-25 PROCEDURE — 99285 EMERGENCY DEPT VISIT HI MDM: CPT | Mod: 25

## 2024-01-25 PROCEDURE — 81003 URINALYSIS AUTO W/O SCOPE: CPT | Performed by: NURSE PRACTITIONER

## 2024-01-25 PROCEDURE — 93005 ELECTROCARDIOGRAM TRACING: CPT

## 2024-01-25 PROCEDURE — 93010 ELECTROCARDIOGRAM REPORT: CPT | Mod: ,,, | Performed by: INTERNAL MEDICINE

## 2024-01-25 PROCEDURE — 25000003 PHARM REV CODE 250: Performed by: NURSE PRACTITIONER

## 2024-01-25 RX ORDER — POTASSIUM CHLORIDE 20 MEQ/1
20 TABLET, EXTENDED RELEASE ORAL
Status: COMPLETED | OUTPATIENT
Start: 2024-01-25 | End: 2024-01-25

## 2024-01-25 RX ADMIN — POTASSIUM CHLORIDE 20 MEQ: 1500 TABLET, EXTENDED RELEASE ORAL at 03:01

## 2024-01-25 NOTE — ED PROVIDER NOTES
"Encounter Date: 2024       History     Chief Complaint   Patient presents with    Dizziness     Pt. Reports Dizziness and weakness onset Prior to going to sleep last night. Denies CP. SOB. States "I feel me heart in my stomach". Pt. States "I feel like I'm skipping breathes, and all this started after I eat candy, its probaly my sugar". Pt. Denies HA, Recent falls, and injury. Fever, dysuria     C/o intermittent dizziness, weakness and palpitations onset last night      Review of patient's allergies indicates:   Allergen Reactions    Ibuprofen Other (See Comments)     Other reaction(s): Abdominal Pain, Stomach ulcer    Sulfamethoxazole-trimethoprim Nausea And Vomiting     Past Medical History:   Diagnosis Date    Anxiety and depression     Hyperlipidemia     Hypertension     Insomnia     Known health problems: none     Unspecified osteoarthritis, unspecified site     bilateral knees     Past Surgical History:   Procedure Laterality Date    CARPAL TUNNEL RELEASE       SECTION  1986     SECTION  1988     SECTION  1992     SECTION WITH TUBAL LIGATION  1990     Family History   Problem Relation Age of Onset    Colon cancer Mother         onset unknown    No Known Problems Brother     No Known Problems Sister     Breast cancer Maternal Aunt         onset unknown    Colon cancer Maternal Cousin 59    Ovarian cancer Neg Hx     Uterine cancer Neg Hx     Cervical cancer Neg Hx      Social History     Tobacco Use    Smoking status: Former     Current packs/day: 0.00     Average packs/day: 1 pack/day for 39.9 years (39.9 ttl pk-yrs)     Types: Cigarettes     Start date:      Quit date: 2021     Years since quittin.1     Passive exposure: Never    Smokeless tobacco: Never   Substance Use Topics    Alcohol use: Never    Drug use: Never     Review of Systems   Cardiovascular:  Positive for palpitations.   Neurological:  Positive for dizziness and weakness. "   All other systems reviewed and are negative.      Physical Exam     Initial Vitals [01/25/24 1404]   BP Pulse Resp Temp SpO2   126/89 97 19 98.6 °F (37 °C) 99 %      MAP       --         Physical Exam    Nursing note and vitals reviewed.  Constitutional: She appears well-developed and well-nourished.   HENT:   Head: Normocephalic and atraumatic.   Eyes: Pupils are equal, round, and reactive to light.   Neck: Neck supple.   Normal range of motion.  Cardiovascular:  Normal rate, regular rhythm and normal heart sounds.           Pulmonary/Chest: Breath sounds normal.   Musculoskeletal:         General: Normal range of motion.      Cervical back: Normal range of motion and neck supple.     Neurological: She is alert and oriented to person, place, and time.   Skin: Skin is warm and dry. Capillary refill takes less than 2 seconds.   Psychiatric: She has a normal mood and affect. Her behavior is normal. Judgment and thought content normal.         ED Course   Procedures  Labs Reviewed   COMPREHENSIVE METABOLIC PANEL - Abnormal; Notable for the following components:       Result Value    Potassium Level 3.2 (*)     BUN/Creatinine Ratio 9 (*)     All other components within normal limits   CBC WITH DIFFERENTIAL - Abnormal; Notable for the following components:    MPV 9.1 (*)     Mono # 0.51 (*)     IG# 0.04 (*)     IG% 0.6 (*)     All other components within normal limits   LIPASE - Normal   TROPONIN I - Normal   URINALYSIS - Normal    Narrative:      URINE STABILITY IS 2 HOURS AT ROOM TEMP OR    SIX HOURS REFRIGERATED. PERFORMING TESTING ON    SPECIMENS GREATER THAN THIS AGE MAY AFFECT THE    FOLLOWING TESTS:    PH          SPECIFIC GRAVITY           BLOOD    CLARITY     BILIRUBIN               UROBILINOGEN   CBC W/ AUTO DIFFERENTIAL    Narrative:     The following orders were created for panel order CBC auto differential.  Procedure                               Abnormality         Status                     ---------                                -----------         ------                     CBC with Differential[[id number]]       Abnormal            Final result                 Please view results for these tests on the individual orders.   POCT GLUCOSE, HAND-HELD DEVICE   POCT GLUCOSE          Imaging Results              X-Ray Chest 1 View (Final result)  Result time 01/25/24 14:23:39      Final result by Stu Louis MD (01/25/24 14:23:39)                   Impression:      No acute abnormality.      Electronically signed by: Stu Louis  Date:    01/25/2024  Time:    14:23               Narrative:    EXAMINATION:  XR CHEST 1 VIEW    CLINICAL HISTORY:  Dizziness and giddiness    TECHNIQUE:  Single frontal view of the chest was performed.    COMPARISON:  05/27/2022    FINDINGS:  The lungs are clear, with normal appearance of pulmonary vasculature and no pleural effusion or pneumothorax.    The cardiac silhouette is normal in size. The hilar and mediastinal contours are unremarkable.    Bones are intact.                                       Medications   potassium chloride SA CR tablet 20 mEq (20 mEq Oral Given 1/25/24 1508)     Medical Decision Making  The patient states she feels much better prior to discharge. She has a follow up appointment with PCP next week.    Problems Addressed:  Dizziness: acute illness or injury  Hypokalemia: acute illness or injury  Palpitations: acute illness or injury    Amount and/or Complexity of Data Reviewed  Labs: ordered.  Radiology: ordered.                                      Clinical Impression:  Final diagnoses:  [R42] Dizziness  [R00.2] Palpitations  [E87.6] Hypokalemia (Primary)          ED Disposition Condition    Discharge Stable          ED Prescriptions    None       Follow-up Information       Follow up With Specialties Details Why Contact Info    Amie Mckeon FNP-C Family Medicine Call  As needed [address]  [facility] [address] [phone]               Smita Odonnell  [place], [facility]  01/25/24 1549

## 2024-02-22 ENCOUNTER — HOSPITAL ENCOUNTER (OUTPATIENT)
Dept: RADIOLOGY | Facility: HOSPITAL | Age: 59
Discharge: HOME | End: 2024-02-22
Attending: SURGERY
Payer: MEDICAID

## 2024-02-22 DIAGNOSIS — R10.13 ABDOMINAL PAIN, EPIGASTRIC: ICD-10-CM

## 2024-02-22 PROCEDURE — 76700 US EXAM ABDOM COMPLETE: CPT | Mod: TC

## 2024-03-15 ENCOUNTER — HOSPITAL ENCOUNTER (OUTPATIENT)
Dept: RADIOLOGY | Facility: HOSPITAL | Age: 59
Discharge: HOME | End: 2024-03-15
Attending: SURGERY
Payer: MEDICAID

## 2024-03-15 DIAGNOSIS — R10.13 ABDOMINAL PAIN, EPIGASTRIC: ICD-10-CM

## 2024-03-15 PROCEDURE — A9698 NON-RAD CONTRAST MATERIALNOC: HCPCS | Performed by: SURGERY

## 2024-03-15 PROCEDURE — 74250 X-RAY XM SM INT 1CNTRST STD: CPT | Mod: TC

## 2024-03-15 PROCEDURE — 25500020 PHARM REV CODE 255: Performed by: SURGERY

## 2024-03-15 PROCEDURE — 74246 X-RAY XM UPR GI TRC 2CNTRST: CPT | Mod: TC

## 2024-03-15 RX ADMIN — BARIUM SULFATE 140 ML: 980 POWDER, FOR SUSPENSION ORAL at 07:03

## 2024-03-21 ENCOUNTER — TELEPHONE (OUTPATIENT)
Dept: ORTHOPEDICS | Facility: CLINIC | Age: 59
End: 2024-03-21

## 2024-03-21 ENCOUNTER — OFFICE VISIT (OUTPATIENT)
Dept: ORTHOPEDICS | Facility: CLINIC | Age: 59
End: 2024-03-21
Payer: MEDICAID

## 2024-03-21 VITALS
HEIGHT: 60 IN | WEIGHT: 123.63 LBS | TEMPERATURE: 98 F | DIASTOLIC BLOOD PRESSURE: 70 MMHG | SYSTOLIC BLOOD PRESSURE: 101 MMHG | BODY MASS INDEX: 24.27 KG/M2 | HEART RATE: 74 BPM

## 2024-03-21 DIAGNOSIS — M17.0 PRIMARY OSTEOARTHRITIS OF BOTH KNEES: Primary | ICD-10-CM

## 2024-03-21 PROCEDURE — 99214 OFFICE O/P EST MOD 30 MIN: CPT | Mod: 25,S$PBB,, | Performed by: NURSE PRACTITIONER

## 2024-03-21 PROCEDURE — 3008F BODY MASS INDEX DOCD: CPT | Mod: CPTII,,, | Performed by: NURSE PRACTITIONER

## 2024-03-21 PROCEDURE — 20610 DRAIN/INJ JOINT/BURSA W/O US: CPT | Mod: 50,PBBFAC | Performed by: NURSE PRACTITIONER

## 2024-03-21 PROCEDURE — 1160F RVW MEDS BY RX/DR IN RCRD: CPT | Mod: CPTII,,, | Performed by: NURSE PRACTITIONER

## 2024-03-21 PROCEDURE — 3074F SYST BP LT 130 MM HG: CPT | Mod: CPTII,,, | Performed by: NURSE PRACTITIONER

## 2024-03-21 PROCEDURE — 1159F MED LIST DOCD IN RCRD: CPT | Mod: CPTII,,, | Performed by: NURSE PRACTITIONER

## 2024-03-21 PROCEDURE — 99214 OFFICE O/P EST MOD 30 MIN: CPT | Mod: PBBFAC,25 | Performed by: NURSE PRACTITIONER

## 2024-03-21 PROCEDURE — 3078F DIAST BP <80 MM HG: CPT | Mod: CPTII,,, | Performed by: NURSE PRACTITIONER

## 2024-03-21 PROCEDURE — 4010F ACE/ARB THERAPY RXD/TAKEN: CPT | Mod: CPTII,,, | Performed by: NURSE PRACTITIONER

## 2024-03-21 RX ORDER — TRIAMCINOLONE ACETONIDE 40 MG/ML
40 INJECTION, SUSPENSION INTRA-ARTICULAR; INTRAMUSCULAR
Status: COMPLETED | OUTPATIENT
Start: 2024-03-21 | End: 2024-03-21

## 2024-03-21 RX ORDER — LIDOCAINE HYDROCHLORIDE 10 MG/ML
5 INJECTION, SOLUTION EPIDURAL; INFILTRATION; INTRACAUDAL; PERINEURAL
Status: COMPLETED | OUTPATIENT
Start: 2024-03-21 | End: 2024-03-21

## 2024-03-21 RX ADMIN — TRIAMCINOLONE ACETONIDE 40 MG: 40 INJECTION, SUSPENSION INTRA-ARTICULAR; INTRAMUSCULAR at 09:03

## 2024-03-21 RX ADMIN — LIDOCAINE HYDROCHLORIDE 5 ML: 10 INJECTION, SOLUTION EPIDURAL; INFILTRATION; INTRACAUDAL; PERINEURAL at 09:03

## 2024-03-21 NOTE — PROGRESS NOTES
"  Subjective:   PATIENT ID: Roya Wills is a 59 y.o. female. Smoker. Employment HX: , currently employed.    Seen [facility] for same DX since 2021.   CHIEF COMPLAINT: Knee Pain of the Left Knee (F/U Bilat Knees. Pain Level 5 Rt knee > Lt Pt. Ready for Inj. today) and Knee Pain of the Right Knee    HPI:    Bilateral L > R aching medial knee pain.   Injury: no known injury  Onset: several years ago fluctuates   Modifying Factors: worse with activity, improves with rest, stiffness after immobilization, and improves with less than 30 minutes activity  Associated Symptoms: crepitus, decreased ROM, swelling, and "giving out"   Activity: sedentary with light activity and pain moderately interferes with ADLs   Previous Treatments:  BMI reduction ongoing education, HEP withTheraBand, OTC pain relievers: Tylenol, RX medications: Cymbalta, RX PT completed 12 wks/ 2 xs per week d/c'd 11/2022, CSI since 2021 last injection 9/20/22, and VS injections since 2023 last series 9/8/23  with some relief but symptoms returning  PMH: + GI bleeding  Family History: + OA    NOTE: Follow up for bilateral knee DJD.  Conservative treatments providing adequate relief at this time and does not desire surgical treatment options at this time.  VS injections  given 9/8/23 with adequate relief, lasting 5-6 months.  Symptoms returning recently over past few weeks affecting ADLs.  Requesting VS injections  today for symptom relief.   Aware we will need to seek PA, requesting CSI today for symptom relief while awaiting PA for VS injections.     Current Outpatient Medications:     atorvastatin (LIPITOR) 10 MG tablet, Take 10 mg by mouth., Disp: , Rfl:     famotidine (PEPCID) 20 MG tablet, Take 1 tablet (20 mg total) by mouth 2 (two) times daily., Disp: 60 tablet, Rfl: 11    lisinopriL (PRINIVIL,ZESTRIL) 5 MG tablet, , Disp: , Rfl:     metoprolol succinate (TOPROL-XL) 25 MG 24 hr tablet, Take 25 mg by mouth., Disp: , Rfl:     QUEtiapine " "(SEROQUEL) 400 MG tablet, Take 400 mg by mouth once daily., Disp: , Rfl:     DULoxetine (CYMBALTA) 60 MG capsule, Take 60 mg by mouth., Disp: , Rfl:     pantoprazole (PROTONIX) 40 MG tablet, Take 1 tablet (40 mg total) by mouth once daily., Disp: 30 tablet, Rfl: 1    Current Facility-Administered Medications:     triamcinolone acetonide injection 40 mg, 40 mg, Intra-articular, 1 time in Clinic/HOD, Elle Garber NP    triamcinolone acetonide injection 40 mg, 40 mg, Intra-articular, 1 time in Clinic/HOD, Elle Garber, NP  Review of patient's allergies indicates:   Allergen Reactions    Ibuprofen Other (See Comments)     Other reaction(s): Abdominal Pain, Stomach ulcer    Sulfamethoxazole-trimethoprim Nausea And Vomiting     No results found for: "HGBA1C"   Body mass index is 24.14 kg/m².   Vitals:    03/21/24 0905 03/21/24 0906   Weight: 56.1 kg (123 lb 9.6 oz)    Height: 5' (1.524 m)    PainSc:    5     REVIEW OF SYSTEMS:  A ten-point review of systems was performed and is negative, except as mentioned above   Objective:   MSK Knee Exam  General:  no apparent distress, no pain indicators,  obesity  Inspection:   lower extremities in proportion with overall body habitus, no erythema   , limping gait w/ full weight bearing   LEFT KNEE RIGHT KNEE   mild knee effusion, noted varus deformity, no contusion, no masses, no scars mild knee effusion, noted varus deformity, no contusion, no masses, no scars   Palpation:     LEFT KNEE RIGHT KNEE   normal temperature, noted tenderness over the medial joint line, noted patellar grind with patella compression and noted patellar facet pain, noted  crepitus, mild quad deconditioning, no active mechanical locking noted w/ joint movement,  no tenderness of patellar tendon or tibial plateau, no fullness noted to popliteal bursa  normal temperature, noted tenderness over the medial joint line, noted patellar grind with patella compression and noted patellar facet pain, " noted  crepitus, mild quad deconditioning, no active mechanical locking noted w/ joint movement,  no tenderness of patellar tendon or tibial plateau, no fullness noted to popliteal bursa      ROM Active Flexion / Extension (0-140)  Left 0 / 121 w/ pain Right 0 / 119 w/ pain   Strength Flexion / Extension (5 / 5)  Left 4 / 4 Right 4 / 4     Special Testing:         IT Band Syndrome L+ L-- R+ R-- Not Tested    Tequila's Test [] [x] [] [x] []    Joint Effusion         Ballotable Effusion [] [x] [] [x] []    Fluid Wave [] [x] [] [x] []    Patellar Testing         Apprehension [] [x] [] [x] []    Meniscal Injury         Eboni's Test [x] [] [x] [] []    Thessaly's Test [] [] [] [] [x]    Ligament Injury         ACL Anterior Drawer [] [x] [] [x] []    PCL Posterior Drawer [] [x] [] [x] []    LCL Varus Test [] [x] [] [x] []    MCL Valgus Test [] [x] [] [x] []      Hip Exam normal  Ankle Exam normal  Neurovascular: Intact to light touch  Neuro/ Psych: Awake, alert, oriented, normal mood and affect  Lymphatic: No LAD  Assessment:   IMAGING: X-ray 4 views of right knee dated 6/2/23 reviewed and independently interpreted by me.  Discussed with patient. Noted no acute, DJD noted.    XR KNEE COMP 4 OR MORE VIEWS LEFT 6/2/23  CLINICAL HISTORY:  Bilateral primary osteoarthritis of knee  FINDINGS:  There is some narrowing of the medial compartment of the knee joint articular spaces are otherwise preserved with some narrowing of the medial compartment of the opposite knee as seen on the standing projection no acute fractures or dislocations are otherwise identified  Impression:  Degenerative changes    EXAMINATION STUDY: MRI KNEE WITHOUT CONTRAST RIGHT 6/29/23  CLINICAL HX: -OP- FALL FROM TRAMPOLINE 1.5 YRS AGO, PAIN SINCE  PMH: OSTEOARTHRITIS  COMPARISON:  Plain films of the right knee dated 06/02/2023  FINDINGS:  Multiplanar imaging using multiple sequences was performed. I see no evidence of fractures or dislocations. No  worrisome, abnormal signal intensity is noted within the skeletal structures. The quadriceps and patellar tendons appear intact and unremarkable. The ACL and PCL appear intact and unremarkable. The medial and lateral collateral ligamentous complexes appear intact and unremarkable. Moderate degenerative changes are noted involving the posterior horn of the medial meniscus with no high grade meniscal tears appreciated.  The lateral meniscus appears intact and unremarkable. No obvious muscle tears or abnormalities involving the adjacent soft tissue structures is noted.   A mild-to-moderate synovial effusion extends into the suprapatellar bursa.  Impression:  1. Moderate degenerative changes are noted involving the posterior horn of the medial meniscus with no high grade underlying tears appreciated.  2. A mild-to-moderate synovial effusion extends into the suprapatellar bursa.    EMR REVIEW: completed with noted prior visit 8/7/23 reviewed.    DIAGNOSIS:  1. Primary osteoarthritis of both knees    2. BMI 24.0-24.9, adult      Plan:     Ongoing education about DX and treatment recommendations including conservative treatments of daily HEP with TheraBand, BMI reduction goal 5-10% of body weight (125# overall goal), muscle strengthening with use of stationary bike (RPM set at 80 or > with slow progression to goal of 40 minutes 3-4 times per week as tolerated), adequate vit D/C, glucosamine 1500 mg/day and daily acetaminophen 1000 mg 3 times/ day if able to tolerate.    Treatment plan: Moderate exacerbation of chronic Bilateral L < R Moderate knee arthritis Patient requesting to try VS injection for Bilateral  knee arthritis with failed conservative treatments including: RX NSAID, formal RX PT, CSI, HEP > 3 months, and VS.    Will seek PA from insurance.   Will provide CSI today for symptom relief while awaiting PA for VS injections.       Procedure: CSI v. VS injections discussed, s/t failed conservative treatments  patient consents to CSI today   RX Medications: continue medications as RX per PCP.  RTC: 6 weeks  NOTE: Conversation had with patient discussing surgical versus conservative treatment options.  At this time patient declines referral to surgeon for further eval. and surgical treatment options.  If status changes patient will up date me.     Elle BARNES  Procedure Note:   Large Joint Aspiration/Injection: bilateral knee    Date/Time: 3/21/2024 9:20 AM    Performed by: Elle Garber NP  Authorized by: Elle Garber NP    Location:  Knee  Laterality:  Bilateral  Site:  Bilateral knee  Medications (Right):  5 mL LIDOCAINE 1% (PF) 50 mg / 5 mL; 40 mg KENALOG 40 mg / 1 mL  Medications (Left):  5 mL LIDOCAINE 1% (PF) 50 mg / 5 mL; 40 mg KENALOG 40 mg / 1 mL     Ortho Procedure Note   Procedure: BILATERAL Knee CSI lateral suprapatellar approach     Indications: Therapeutic Indication - Decrease pain, Increase range of motion and improve quality of life:   Risks:  Possible complications with the injection include bleeding, infection (.01%), tendon rupture, steroid flare, fat pad or soft tissue atrophy, skin depigmentation, allergic reaction to medications and vasovagal response. (steroid flare treatment is rest, ice, NSAIDs and resolves in 24-36 hours)  Consent:  Procedure, risks, benefits, and alternatives explained the patient, who voiced understanding and agreed to proceed with procedure.  Consent signed and scanned into the medical record.   No absolute contraindications (cellulitis overlying joint, infection, lack of informed consent, allergy to injection medication, AVN protein or egg allergy for sodium hyaluronate, or history of steroid flare) or relative contraindications (uncontrolled DM2 A1c>10, coagulopathy, INR > 3.5, previous joint replacement or history of AVN).        Staff: Elle BARNES  Description:  Time-out performed.  The patient was prepped in normal sterile fashion  use of chlorhexidine scrub and the appropriate and anatomic landmarks were identified.  Sterile 21 gauge 1.5 inch needle was used. Topical ethyl chloride was applied. Contents of syringe included:     RIGHT KNEE: 5 ml of 1% of lidocaine (PF) with 40 mg of Kenalog    LEFT KNEE: 5 ml of 1% of lidocaine (PF) with 40 mg of Kenalog      Drainage: n/a   Complications: None   EBL: None   Post Procedure: Patient alert, and moving all extremities. ROM improved, pain decreased.  Good peripheral pulses, no signs of vascular compromise and range of motion intact.  Aftercare instructions were given to patient at time of discharge.  Relative rest for 3 days-avoiding excess activity.  Place ice on the area for 15 minutes every 4-6 hours. Patient may take Tylenol a 1000 mg b.i.d. or ibuprofen 600 mg t.i.d. for the next 3-4 days if not on medication already and safe to take pending co-morbidities.  Protect the area for the next 1-8 hours if anesthetic was used.  Avoid excessive activity for the next 3-4 weeks.  ER precautions given for fever, severe joint pain or allergic reaction or other new symptoms related to the joint injection.

## 2024-03-21 NOTE — TELEPHONE ENCOUNTER
----- Message from Elle Garber NP sent at 3/21/2024  9:25 AM CDT -----  Regarding: Prior Authorization for Viscosupplementation  Please obtain prior authorization for viscosupplementation.  After obtaining, schedule a patient for procedure only appointment as appropriate with NP  (Euflexxa/Orthovisc/Hyalgan/Synvisc 1 visit per week for 3 weeks or Durolane 1 visit)    Laterality: bilateral  Estimated Return to Clinic:  6 weeks (established visit)

## 2024-04-01 ENCOUNTER — HOSPITAL ENCOUNTER (OUTPATIENT)
Dept: RADIOLOGY | Facility: HOSPITAL | Age: 59
Discharge: HOME | End: 2024-04-01
Attending: SURGERY
Payer: MEDICAID

## 2024-04-01 ENCOUNTER — HOSPITAL ENCOUNTER (OUTPATIENT)
Dept: PREADMISSION TESTING | Facility: HOSPITAL | Age: 59
Discharge: HOME | End: 2024-04-01
Attending: SURGERY
Payer: MEDICAID

## 2024-04-01 VITALS — HEIGHT: 60 IN | WEIGHT: 123.69 LBS | BODY MASS INDEX: 24.28 KG/M2

## 2024-04-01 DIAGNOSIS — Z01.818 PRE-OP TESTING: ICD-10-CM

## 2024-04-01 DIAGNOSIS — Z01.818 PRE-OP TESTING: Primary | ICD-10-CM

## 2024-04-01 LAB
ALBUMIN SERPL-MCNC: 4.2 G/DL (ref 3.4–5)
ALBUMIN/GLOB SERPL: 1.6 RATIO
ALP SERPL-CCNC: 122 UNIT/L (ref 50–144)
ALT SERPL-CCNC: 24 UNIT/L (ref 1–45)
ANION GAP SERPL CALC-SCNC: 8 MEQ/L (ref 2–13)
AST SERPL-CCNC: 25 UNIT/L (ref 14–36)
BASOPHILS # BLD AUTO: 0.05 X10(3)/MCL (ref 0.01–0.08)
BASOPHILS NFR BLD AUTO: 0.5 % (ref 0.1–1.2)
BILIRUB SERPL-MCNC: 0.4 MG/DL (ref 0–1)
BUN SERPL-MCNC: 13 MG/DL (ref 7–20)
CALCIUM SERPL-MCNC: 9.8 MG/DL (ref 8.4–10.2)
CHLORIDE SERPL-SCNC: 104 MMOL/L (ref 98–110)
CO2 SERPL-SCNC: 30 MMOL/L (ref 21–32)
CREAT SERPL-MCNC: 1 MG/DL (ref 0.66–1.25)
CREAT/UREA NIT SERPL: 13 (ref 12–20)
EOSINOPHIL # BLD AUTO: 0.14 X10(3)/MCL (ref 0.04–0.36)
EOSINOPHIL NFR BLD AUTO: 1.5 % (ref 0.7–7)
ERYTHROCYTE [DISTWIDTH] IN BLOOD BY AUTOMATED COUNT: 13.4 % (ref 11–14.5)
GFR SERPLBLD CREATININE-BSD FMLA CKD-EPI: 66 MLS/MIN/1.73/M2
GLOBULIN SER-MCNC: 2.6 GM/DL (ref 2–3.9)
GLUCOSE SERPL-MCNC: 96 MG/DL (ref 70–115)
HCT VFR BLD AUTO: 39.2 % (ref 36–48)
HGB BLD-MCNC: 13.2 G/DL (ref 11.8–16)
IMM GRANULOCYTES # BLD AUTO: 0.04 X10(3)/MCL (ref 0–0.03)
IMM GRANULOCYTES NFR BLD AUTO: 0.4 % (ref 0–0.5)
INR PPP: 0.9
LYMPHOCYTES # BLD AUTO: 2.52 X10(3)/MCL (ref 1.16–3.74)
LYMPHOCYTES NFR BLD AUTO: 27.1 % (ref 20–55)
MCH RBC QN AUTO: 31.1 PG (ref 27–34)
MCHC RBC AUTO-ENTMCNC: 33.7 G/DL (ref 31–37)
MCV RBC AUTO: 92.2 FL (ref 79–99)
MONOCYTES # BLD AUTO: 0.65 X10(3)/MCL (ref 0.24–0.36)
MONOCYTES NFR BLD AUTO: 7 % (ref 4.7–12.5)
NEUTROPHILS # BLD AUTO: 5.89 X10(3)/MCL (ref 1.56–6.13)
NEUTROPHILS NFR BLD AUTO: 63.5 % (ref 37–73)
NRBC BLD AUTO-RTO: 0 %
PLATELET # BLD AUTO: 399 X10(3)/MCL (ref 140–371)
PMV BLD AUTO: 9.4 FL (ref 9.4–12.4)
POTASSIUM SERPL-SCNC: 3.7 MMOL/L (ref 3.5–5.1)
PROT SERPL-MCNC: 6.8 GM/DL (ref 6.3–8.2)
PROTHROMBIN TIME: 9.2 SECONDS (ref 9.3–11.9)
RBC # BLD AUTO: 4.25 X10(6)/MCL (ref 4–5.1)
SODIUM SERPL-SCNC: 142 MMOL/L (ref 135–145)
WBC # SPEC AUTO: 9.29 X10(3)/MCL (ref 4–11.5)

## 2024-04-01 PROCEDURE — 93010 ELECTROCARDIOGRAM REPORT: CPT | Mod: ,,, | Performed by: INTERNAL MEDICINE

## 2024-04-01 PROCEDURE — 80053 COMPREHEN METABOLIC PANEL: CPT | Performed by: SURGERY

## 2024-04-01 PROCEDURE — 71046 X-RAY EXAM CHEST 2 VIEWS: CPT | Mod: TC

## 2024-04-01 PROCEDURE — 85610 PROTHROMBIN TIME: CPT | Performed by: SURGERY

## 2024-04-01 PROCEDURE — 85025 COMPLETE CBC W/AUTO DIFF WBC: CPT | Performed by: SURGERY

## 2024-04-01 PROCEDURE — 93005 ELECTROCARDIOGRAM TRACING: CPT

## 2024-04-01 RX ORDER — DEXTROSE MONOHYDRATE AND SODIUM CHLORIDE 5; .45 G/100ML; G/100ML
INJECTION, SOLUTION INTRAVENOUS
Status: CANCELLED | OUTPATIENT
Start: 2024-04-04

## 2024-04-01 RX ORDER — CEFAZOLIN SODIUM IN 0.9 % NACL 2 G/100 ML
2 PLASTIC BAG, INJECTION (ML) INTRAVENOUS
Status: CANCELLED | OUTPATIENT
Start: 2024-04-04

## 2024-04-01 RX ORDER — SODIUM CHLORIDE 9 MG/ML
INJECTION, SOLUTION INTRAVENOUS CONTINUOUS
Status: CANCELLED | OUTPATIENT
Start: 2024-04-01

## 2024-04-01 NOTE — DISCHARGE INSTRUCTIONS

## 2024-04-02 LAB
OHS QRS DURATION: 74 MS
OHS QTC CALCULATION: 483 MS

## 2024-04-03 ENCOUNTER — ANESTHESIA EVENT (OUTPATIENT)
Dept: SURGERY | Facility: HOSPITAL | Age: 59
End: 2024-04-03
Payer: MEDICAID

## 2024-04-03 NOTE — ANESTHESIA PREPROCEDURE EVALUATION
2024  Roya Wills is a 59 y.o., female.  Anesthesia History    No specialty history recorded    Other Medical History   Known health problems: none Hyperlipidemia   Hypertension Anxiety and depression   Insomnia Unspecified osteoarthritis, unspecified site     Surgical History    CARPAL TUNNEL RELEASE  SECTION    SECTION  SECTION WITH TUBAL LIGATION    SECTION      COVID-19 Risk of Complications  Current as of yesterday    0 0 - 3 Points: Low Risk   3 - 6 Points: Medium Risk   6 - 16 Points: High Risk     Last Change: N/A      Details   Substance History    Smoking Status: Some Days   Passive Exposure: Never   Smokeless Tobacco Status: Never   Alcohol use: Never   Drug use: Never     Anesthesia Family History    No history of this type found      Current Gender Identity    Questions Responses   Patient's Gender Identity: Female   Patient's sex assigned at birth: Female          Pre-op Assessment    I have reviewed the Patient Summary Reports.     I have reviewed the Nursing Notes. I have reviewed the NPO Status.   I have reviewed the Medications.     Review of Systems  Anesthesia Hx:  No problems with previous Anesthesia             Denies Family Hx of Anesthesia complications.    Denies Personal Hx of Anesthesia complications.                    Social:  Smoker       Hematology/Oncology:  Hematology Normal   Oncology Normal                                   EENT/Dental:  EENT/Dental Normal           Cardiovascular:  Exercise tolerance: good   Hypertension, well controlled              ECG has been reviewed.                    Hypertension, Essential Hypertension         Pulmonary:  Pulmonary Normal                       Renal/:  Renal/ Normal                 Hepatic/GI:  Hepatic/GI Normal                 Musculoskeletal:  Arthritis                Neurological:  Neurology Normal                                      Endocrine:  Endocrine Normal            Dermatological:  Skin Normal    Psych:  Psychiatric History anxiety                 Physical Exam  General: Well nourished, Cooperative, Alert and Oriented    Airway:  Mallampati: II / II  Mouth Opening: Normal  TM Distance: Normal  Tongue: Normal  Neck ROM: Normal ROM    Dental:  Dentures        Anesthesia Plan  Type of Anesthesia, risks & benefits discussed:    Anesthesia Type: Gen ETT  Intra-op Monitoring Plan: Standard ASA Monitors  Post Op Pain Control Plan: multimodal analgesia  Induction:  IV  Airway Plan: Direct  Informed Consent: Informed consent signed with the Patient and all parties understand the risks and agree with anesthesia plan.  All questions answered. Patient consented to blood products? Yes  ASA Score: 3  Day of Surgery Review of History & Physical: H&P Update referred to the surgeon/provider.I have interviewed and examined the patient. I have reviewed the patient's H&P dated: There are no significant changes.     Ready For Surgery From Anesthesia Perspective.     .

## 2024-04-04 ENCOUNTER — ANESTHESIA (OUTPATIENT)
Dept: SURGERY | Facility: HOSPITAL | Age: 59
End: 2024-04-04
Payer: MEDICAID

## 2024-04-04 ENCOUNTER — HOSPITAL ENCOUNTER (OUTPATIENT)
Facility: HOSPITAL | Age: 59
Discharge: HOME | End: 2024-04-04
Attending: SURGERY | Admitting: SURGERY
Payer: MEDICAID

## 2024-04-04 VITALS
WEIGHT: 123.69 LBS | DIASTOLIC BLOOD PRESSURE: 84 MMHG | SYSTOLIC BLOOD PRESSURE: 132 MMHG | RESPIRATION RATE: 20 BRPM | HEART RATE: 75 BPM | BODY MASS INDEX: 24.28 KG/M2 | HEIGHT: 60 IN | TEMPERATURE: 97 F | OXYGEN SATURATION: 96 %

## 2024-04-04 DIAGNOSIS — K81.1 CHRONIC CHOLECYSTITIS: Primary | ICD-10-CM

## 2024-04-04 DIAGNOSIS — Z01.818 PRE-OP TESTING: ICD-10-CM

## 2024-04-04 PROCEDURE — D9220A PRA ANESTHESIA: Mod: ,,, | Performed by: NURSE ANESTHETIST, CERTIFIED REGISTERED

## 2024-04-04 PROCEDURE — 27201423 OPTIME MED/SURG SUP & DEVICES STERILE SUPPLY: Performed by: SURGERY

## 2024-04-04 PROCEDURE — 25000003 PHARM REV CODE 250: Performed by: SURGERY

## 2024-04-04 PROCEDURE — 63600175 PHARM REV CODE 636 W HCPCS: Performed by: SURGERY

## 2024-04-04 PROCEDURE — 25000003 PHARM REV CODE 250: Performed by: NURSE ANESTHETIST, CERTIFIED REGISTERED

## 2024-04-04 PROCEDURE — 37000009 HC ANESTHESIA EA ADD 15 MINS: Performed by: SURGERY

## 2024-04-04 PROCEDURE — 71000016 HC POSTOP RECOV ADDL HR: Performed by: SURGERY

## 2024-04-04 PROCEDURE — 63600175 PHARM REV CODE 636 W HCPCS: Performed by: NURSE ANESTHETIST, CERTIFIED REGISTERED

## 2024-04-04 PROCEDURE — 71000033 HC RECOVERY, INTIAL HOUR: Performed by: SURGERY

## 2024-04-04 PROCEDURE — 37000008 HC ANESTHESIA 1ST 15 MINUTES: Performed by: SURGERY

## 2024-04-04 PROCEDURE — 36000709 HC OR TIME LEV III EA ADD 15 MIN: Performed by: SURGERY

## 2024-04-04 PROCEDURE — 36000708 HC OR TIME LEV III 1ST 15 MIN: Performed by: SURGERY

## 2024-04-04 PROCEDURE — S5010 5% DEXTROSE AND 0.45% SALINE: HCPCS | Performed by: SURGERY

## 2024-04-04 PROCEDURE — 71000015 HC POSTOP RECOV 1ST HR: Performed by: SURGERY

## 2024-04-04 RX ORDER — PROPOFOL 10 MG/ML
VIAL (ML) INTRAVENOUS
Status: DISCONTINUED | OUTPATIENT
Start: 2024-04-04 | End: 2024-04-04

## 2024-04-04 RX ORDER — FAMOTIDINE 20 MG/1
20 TABLET, FILM COATED ORAL
Status: COMPLETED | OUTPATIENT
Start: 2024-04-04 | End: 2024-04-04

## 2024-04-04 RX ORDER — BUPIVACAINE HYDROCHLORIDE 5 MG/ML
INJECTION, SOLUTION EPIDURAL; INTRACAUDAL
Status: DISCONTINUED | OUTPATIENT
Start: 2024-04-04 | End: 2024-04-04 | Stop reason: HOSPADM

## 2024-04-04 RX ORDER — MIDAZOLAM HYDROCHLORIDE 1 MG/ML
2 INJECTION INTRAMUSCULAR; INTRAVENOUS
Status: COMPLETED | OUTPATIENT
Start: 2024-04-04 | End: 2024-04-04

## 2024-04-04 RX ORDER — GLYCOPYRROLATE 0.2 MG/ML
0.2 INJECTION INTRAMUSCULAR; INTRAVENOUS ONCE
Status: COMPLETED | OUTPATIENT
Start: 2024-04-04 | End: 2024-04-04

## 2024-04-04 RX ORDER — CEFAZOLIN SODIUM 2 G/50ML
2 SOLUTION INTRAVENOUS
Status: DISCONTINUED | OUTPATIENT
Start: 2024-04-04 | End: 2024-04-04 | Stop reason: HOSPADM

## 2024-04-04 RX ORDER — ONDANSETRON HYDROCHLORIDE 2 MG/ML
INJECTION, SOLUTION INTRAVENOUS
Status: DISCONTINUED | OUTPATIENT
Start: 2024-04-04 | End: 2024-04-04

## 2024-04-04 RX ORDER — LAMOTRIGINE 25 MG/1
25 TABLET ORAL DAILY
COMMUNITY

## 2024-04-04 RX ORDER — LIDOCAINE HYDROCHLORIDE 20 MG/ML
INJECTION INTRAVENOUS
Status: DISCONTINUED | OUTPATIENT
Start: 2024-04-04 | End: 2024-04-04

## 2024-04-04 RX ORDER — DEXAMETHASONE SODIUM PHOSPHATE 4 MG/ML
INJECTION, SOLUTION INTRA-ARTICULAR; INTRALESIONAL; INTRAMUSCULAR; INTRAVENOUS; SOFT TISSUE
Status: DISCONTINUED | OUTPATIENT
Start: 2024-04-04 | End: 2024-04-04

## 2024-04-04 RX ORDER — DEXTROSE MONOHYDRATE AND SODIUM CHLORIDE 5; .45 G/100ML; G/100ML
INJECTION, SOLUTION INTRAVENOUS
Status: DISCONTINUED | OUTPATIENT
Start: 2024-04-04 | End: 2024-04-04 | Stop reason: HOSPADM

## 2024-04-04 RX ORDER — VECURONIUM BROMIDE FOR INJECTION 1 MG/ML
INJECTION, POWDER, LYOPHILIZED, FOR SOLUTION INTRAVENOUS
Status: DISCONTINUED | OUTPATIENT
Start: 2024-04-04 | End: 2024-04-04

## 2024-04-04 RX ORDER — FENTANYL CITRATE 50 UG/ML
INJECTION, SOLUTION INTRAMUSCULAR; INTRAVENOUS
Status: DISCONTINUED | OUTPATIENT
Start: 2024-04-04 | End: 2024-04-04

## 2024-04-04 RX ORDER — LIDOCAINE HYDROCHLORIDE 10 MG/ML
INJECTION, SOLUTION EPIDURAL; INFILTRATION; INTRACAUDAL; PERINEURAL
Status: DISCONTINUED | OUTPATIENT
Start: 2024-04-04 | End: 2024-04-04 | Stop reason: HOSPADM

## 2024-04-04 RX ORDER — HYDROMORPHONE HYDROCHLORIDE 1 MG/ML
0.25 INJECTION, SOLUTION INTRAMUSCULAR; INTRAVENOUS; SUBCUTANEOUS EVERY 4 HOURS PRN
Status: DISCONTINUED | OUTPATIENT
Start: 2024-04-04 | End: 2024-04-04 | Stop reason: HOSPADM

## 2024-04-04 RX ORDER — HYDROMORPHONE HYDROCHLORIDE 1 MG/ML
0.5 INJECTION, SOLUTION INTRAMUSCULAR; INTRAVENOUS; SUBCUTANEOUS EVERY 4 HOURS PRN
Status: DISCONTINUED | OUTPATIENT
Start: 2024-04-04 | End: 2024-04-04

## 2024-04-04 RX ADMIN — FAMOTIDINE 20 MG: 20 TABLET, FILM COATED ORAL at 09:04

## 2024-04-04 RX ADMIN — FENTANYL CITRATE 50 MCG: 50 INJECTION, SOLUTION INTRAMUSCULAR; INTRAVENOUS at 11:04

## 2024-04-04 RX ADMIN — CEFAZOLIN SODIUM 2 G: 2 SOLUTION INTRAVENOUS at 10:04

## 2024-04-04 RX ADMIN — FENTANYL CITRATE 50 MCG: 50 INJECTION, SOLUTION INTRAMUSCULAR; INTRAVENOUS at 10:04

## 2024-04-04 RX ADMIN — GLYCOPYRROLATE 0.2 MG: 0.2 INJECTION INTRAMUSCULAR; INTRAVENOUS at 09:04

## 2024-04-04 RX ADMIN — MIDAZOLAM HYDROCHLORIDE 2 MG: 1 INJECTION, SOLUTION INTRAMUSCULAR; INTRAVENOUS at 10:04

## 2024-04-04 RX ADMIN — LIDOCAINE HYDROCHLORIDE 50 MG: 20 INJECTION, SOLUTION INTRAVENOUS at 10:04

## 2024-04-04 RX ADMIN — VECURONIUM BROMIDE 4 MG: 10 INJECTION, POWDER, FOR SOLUTION INTRAVENOUS at 10:04

## 2024-04-04 RX ADMIN — PROPOFOL 120 MG: 10 INJECTION, EMULSION INTRAVENOUS at 10:04

## 2024-04-04 RX ADMIN — ONDANSETRON 4 MG: 2 INJECTION INTRAMUSCULAR; INTRAVENOUS at 10:04

## 2024-04-04 RX ADMIN — FENTANYL CITRATE 100 MCG: 50 INJECTION, SOLUTION INTRAMUSCULAR; INTRAVENOUS at 11:04

## 2024-04-04 RX ADMIN — HYDROMORPHONE HYDROCHLORIDE 0.25 MG: 1 INJECTION, SOLUTION INTRAMUSCULAR; INTRAVENOUS; SUBCUTANEOUS at 12:04

## 2024-04-04 RX ADMIN — DEXTROSE AND SODIUM CHLORIDE: 5; 450 INJECTION, SOLUTION INTRAVENOUS at 09:04

## 2024-04-04 RX ADMIN — SUGAMMADEX 200 MG: 100 INJECTION, SOLUTION INTRAVENOUS at 11:04

## 2024-04-04 RX ADMIN — DEXAMETHASONE SODIUM PHOSPHATE 8 MG: 4 INJECTION, SOLUTION INTRA-ARTICULAR; INTRALESIONAL; INTRAMUSCULAR; INTRAVENOUS; SOFT TISSUE at 10:04

## 2024-04-04 NOTE — DISCHARGE INSTRUCTIONS
Limit activity today.   No heavy lifting >5lbs for at least a week.   Try to walk 3 to 4 times each day. Do your best to walk a little farther and longer each day.  You may need to rest for a while. Talk to your doctor before you run, work out, or play sports.  Do light household work only, such as washing dishes or helping with meals.  Drink 8 to 10 glasses of fluids each day.  Avoid eating greasy or spicy foods.  Your doctor may suggest a high-fiber diet. Ask your doctor if you need to change your diet  Keep site dry. Shower daily, do not submerge site in water.Allow for the exofen to fall off natrually.    When do I need to call the doctor?   Signs of infection. These include a fever of 100.4°F (38°C) or higher, chills, very bad sore throat, pain with passing urine, or wound that will not heal.  Signs of wound infection. These include swelling, redness, warmth around the wound; too much pain when touched; yellowish, greenish, or bloody discharge; foul smell coming from the cut site; cut site opens up.  Pain/nausea unrelieved by medications.   Signs of liver problems like upset stomach or throwing up, belly pain, feeling tired, dark urine, yellow skin or eyes, not hungry.  Bowel movements that are white or gray in color or no bowel movement for 2 to 3 days after surgery  Sudden onset of chest pain, fast heartbeat, breathing problems, and pain or tenderness in your calf could be a sign that a blood clot has traveled to your lungs. Call for emergency help right away.       Exofin  PATIENT AFTER-CARE INSTRUCTIONS          Your wound has been repaired using Exofin® High Viscosity topical tissue adhesive. The list below is a guide for you to understand and care for your wound following your procedure.          1. Keep the wound dry.     You may occasionally and briefly wet your wound in the shower or bath at the direction of your physician, but do not soak or scrub the wound area. After showering or bathing, gently  blot your wound dry with a soft towel.       2. Avoid Topical Medications     Do not apply liquid or ointment medications, lotions, creams, petroleum jelly, mineral oils or any other product to your wound while the Exofin® adhesive film is in place.         3. Do not rub, scratch, or pick at the wound.     Doing so may compromise the integrity of the wound closure and cause scaring.        4. Protect the wound from prolonged sunlight exposure.     Do not use tanning lamps while the film is in place.        5. Check wound appearance.      Some swelling, redness, and pain are common with all wounds and normally will go away as the wound heals. If swelling, redness, or pain increases, or if the wound feels warm to the touch, contact your doctor. Also contact your doctor if the wound edges reopen or separate.        6. Exofin® will naturally slough off between 5 and 10 days after the procedure.     By this time, your wound should be sufficiently healed. This information is not intended as a substitute for the advice of a physician. For more detailed information, talk to your doctor. Your doctor can choose the best treatment for you in your particular circumstances.               For additional questions and concerns, please consult your doctor.

## 2024-04-04 NOTE — ANESTHESIA PROCEDURE NOTES
Intubation    Date/Time: 4/4/2024 10:56 AM    Performed by: Joel Vang CRNA  Authorized by: Joel Vang CRNA    Intubation:     Induction:  Intravenous    Intubated:  Postinduction    Mask Ventilation:  Easy mask    Attempts:  1    Attempted By:  CRNA    Method of Intubation:  Direct    Blade:  Szymanski 2    Laryngeal View Grade: Grade I - full view of cords      Difficult Airway Encountered?: No      Complications:  None    Airway Device:  Oral endotracheal tube    Airway Device Size:  7.0    Style/Cuff Inflation:  Cuffed    Inflation Amount (mL):  5    Tube secured:  21    Secured at:  The lips    Placement Verified By:  Capnometry    Complicating Factors:  None    Findings Post-Intubation:  BS equal bilateral and atraumatic/condition of teeth unchanged

## 2024-04-04 NOTE — ANESTHESIA POSTPROCEDURE EVALUATION
Anesthesia Post Evaluation    Patient: Roya Wills    Procedure(s) Performed: Procedure(s) (LRB):  CHOLECYSTECTOMY, LAPAROSCOPIC, WITH CHOLANGIOGRAM (N/A)    Final Anesthesia Type: general      Patient location during evaluation: PACU  Patient participation: Yes- Able to Participate  Level of consciousness: awake and alert, awake and oriented  Post-procedure vital signs: reviewed and stable  Pain management: adequate  Airway patency: patent    PONV status at discharge: No PONV  Anesthetic complications: no      Cardiovascular status: blood pressure returned to baseline  Respiratory status: unassisted, room air and spontaneous ventilation  Hydration status: euvolemic  Follow-up not needed.              Vitals Value Taken Time   BP 98/67 04/04/24 0845   Temp 36.4 °C (97.5 °F) 04/04/24 0845   Pulse 72 04/04/24 1143   Resp 20 04/04/24 0845   SpO2 99 % 04/04/24 1143   Vitals shown include unvalidated device data.      No case tracking events are documented in the log.      Pain/Chiqui Score: No data recorded

## 2024-04-04 NOTE — OP NOTE
[facility]  Operative Note      Date of Procedure: 4/4/2024     Procedure: Procedure(s) (LRB):  CHOLECYSTECTOMY, LAPAROSCOPIC, WITH CHOLANGIOGRAM (N/A)     Surgeon(s) and Role:     * Arron Byers MD - Primary    Assisting Surgeon: None    Pre-Operative Diagnosis: Chronic cholecystitis [K81.1]    Post-Operative Diagnosis: Post-Op Diagnosis Codes:     * Chronic cholecystitis [K81.1]  A cholangiogram  Anesthesia: General    Operative Findings (including complications, if any):  Patient is a 59-year-old  female with a history of iron-deficiency anemia anxiety depressive disorders who had midepigastric abdominal pain nausea vomiting bloating reflux.  Patient had a ultrasound that shows sludge in the gallbladder on 02/22/2024.  Upper GI with small-bowel follow-through on 03/15/2024 showed thickening of the antrum and pylorus with normal small-bowel follow-through.  Patient was scheduled for cholecystectomy.  Patient was brought to the OR supine position general anesthetic prepped and draped in a sterile fashion had a supraumbilical incision made with insertion of Veress needle insufflation abdomen of 14 mm of mercury with insertion of a 5 mm trocar.  Patient then underwent insertion of 2 lateral 5 mm trocars and 1 5 mm trocar just to the right of falciform ligament.  The gallbladder is grasped and mobilized cystic duct and artery were isolated triangle Calot was clearly dissected intraoperative cholangiogram was obtained.  Cholangiogram was normal.  Clips were placed on the cystic duct and artery and the gallbladder was cauterized off the liver bed removed through trocar site with an endobag.  The aponeuroses of the 5 mm trocar sites were closed with 0 Vicryl on  needle.  SubQ was closed with 4-0 plain gut suture Dermabond was used for the skin.  Sterile dressings were applied no problems were encountered patient tolerated procedure well.        Description of Technical  Procedures:  Noted above       Significant Surgical Tasks Conducted by the Assistant(s), if Applicable:  None       Estimated Blood Loss (EBL): * No values recorded between 4/4/2024 11:11 AM and 4/4/2024 11:36 AM *           Implants: * No implants in log *    Specimens:   Specimen (24h ago, onward)       Start     Ordered    04/04/24 1125  Specimen to Pathology General Surgery  Once        References:    Click here for ordering Quick Tip   Question Answer Comment   Procedure Type: General Surgery    Specimen Source Gallbladder    Clinical Information: Lap Swetha w/ IOC    Release to patient Immediate        04/04/24 1125    04/04/24 1124  Specimen to Pathology  RELEASE UPON ORDERING        References:    Click here for ordering Quick Tip   Question:  Release to patient  Answer:  Immediate    04/04/24 1124                            Condition: Good    Disposition: PACU - hemodynamically stable.    Attestation: I was present and scrubbed for the entire procedure.    Discharge Note    OUTCOME: Patient tolerated treatment/procedure well without complication and is now ready for discharge.      DISPOSITION: Home or Self Care    FINAL DIAGNOSIS:  Chronic cholecystitis with normal intraoperative cholangiography    FOLLOWUP: In clinic 1 week    DISCHARGE INSTRUCTIONS:  No discharge procedures on file.

## 2024-04-04 NOTE — PLAN OF CARE
Patient discharged from the floor via wheelchair accompanied by transport personnel. Patient in stable condition and denies further needs at this time.

## 2024-04-04 NOTE — PLAN OF CARE
Patient transferred to outpatient room in stable condition and resting comfortably. Dynamap monitors applied, SCD machine remained applied, call bell in reach, RN and family at bedside, RN verbalized understanding of report.

## 2024-04-04 NOTE — PLAN OF CARE
Provided patient with discharge education focusing on activity, diet, wound care, worsening signs/symptoms, taking medications, and keeping follow-up appointment. Patient verbalized understanding and denies further questions at this time.

## 2024-04-04 NOTE — DISCHARGE SUMMARY
[facility]  Discharge Note  Short Stay    Procedure(s) (LRB):  CHOLECYSTECTOMY, LAPAROSCOPIC, WITH CHOLANGIOGRAM (N/A)      OUTCOME: Patient tolerated treatment/procedure well without complication and is now ready for discharge.    DISPOSITION: Home or Self Care    FINAL DIAGNOSIS:  Chronic cholecystitis with normal intraoperative cholangiography    FOLLOWUP: In clinic 1 week    DISCHARGE INSTRUCTIONS:  No discharge procedures on file.     TIME SPENT ON DISCHARGE:  5 minutes

## 2024-04-19 ENCOUNTER — PROCEDURE VISIT (OUTPATIENT)
Dept: ORTHOPEDICS | Facility: CLINIC | Age: 59
End: 2024-04-19
Payer: MEDICAID

## 2024-04-19 DIAGNOSIS — M17.0 PRIMARY OSTEOARTHRITIS OF BOTH KNEES: Primary | ICD-10-CM

## 2024-04-19 DIAGNOSIS — K21.9 GASTROESOPHAGEAL REFLUX DISEASE WITHOUT ESOPHAGITIS: Primary | ICD-10-CM

## 2024-04-19 PROCEDURE — 20610 DRAIN/INJ JOINT/BURSA W/O US: CPT | Mod: 50,PBBFAC | Performed by: NURSE PRACTITIONER

## 2024-04-19 PROCEDURE — 99499 UNLISTED E&M SERVICE: CPT | Mod: S$PBB,,, | Performed by: NURSE PRACTITIONER

## 2024-04-19 RX ADMIN — Medication 30 MG: at 10:04

## 2024-04-19 NOTE — PROCEDURES
Large Joint Aspiration/Injection: bilateral knee    Date/Time: 4/19/2024 10:10 AM    Performed by: Elle Garber NP  Authorized by: Elle Garber NP    Location:  Knee  Laterality:  Bilateral  Site:  Bilateral knee  Medications (Right):  30 mg ORTHOVISC 30 mg / 2 mL  Medications (Left):  30 mg ORTHOVISC 30 mg / 2 mL     Ortho Procedure Note   Procedure: BILATERAL Knee Orthovisc # 1 lateral suprapatellar approach     Indications: Therapeutic Indication - Decrease pain, Increase range of motion and improve quality of life:   Risks:  Possible complications with the injection include bleeding, infection (.01%), tendon rupture, fat pad or soft tissue atrophy, skin depigmentation, allergic reaction to medications and vasovagal response.   Consent:  Procedure, risks, benefits, and alternatives explained the patient, who voiced understanding and agreed to proceed with procedure.  Consent signed and scanned into the medical record.   No absolute contraindications (cellulitis overlying joint, infection, lack of informed consent, allergy to injection medication, AVN protein or egg allergy for sodium hyaluronate) or relative contraindications ( coagulopathy, INR > 3.5, previous joint replacement or history of AVN).        Staff: Elle Garber FNP  Description:  Time-out performed.  The patient was prepped in normal sterile fashion use of chlorhexidine scrub and the appropriate and anatomic landmarks were identified.  Sterile 21 gauge 1.5 inch  was used. Topical ethyl chloride was applied. Contents of syringe included:     RIGHT KNEE: 30 mg / 2 ml of Orthovisc injected.    LEFT KNEE: 30 mg / 2 ml of Orthovisc injected.    Drainage: n/a  Complications: None   EBL: None   Post Procedure: Patient alert, and moving all extremities. ROM improved, pain decreased.  Good peripheral pulses, no signs of vascular compromise and range of motion intact.  Aftercare instructions were given to patient at time of discharge.   Relative rest for 3 days-avoiding excess activity.  Place ice on the area for 15 minutes every 4-6 hours. Patient may take Tylenol a 1000 mg b.i.d. or ibuprofen 600 mg t.i.d. for the next 3-4 days if not on medication already and safe to take pending co-morbidities.  Protect the area for the next 1-8 hours if anesthetic was used.  Avoid excessive activity for the next 3-4 weeks.  ER precautions given for fever, severe joint pain or allergic reaction or other new symptoms related to the joint injection.

## 2024-05-02 ENCOUNTER — HOSPITAL ENCOUNTER (OUTPATIENT)
Dept: PREADMISSION TESTING | Facility: HOSPITAL | Age: 59
Discharge: HOME | End: 2024-05-02
Attending: SURGERY
Payer: MEDICAID

## 2024-05-02 VITALS — BODY MASS INDEX: 24.94 KG/M2 | HEIGHT: 60 IN | WEIGHT: 127 LBS

## 2024-05-02 DIAGNOSIS — K21.9 GASTROESOPHAGEAL REFLUX DISEASE, UNSPECIFIED WHETHER ESOPHAGITIS PRESENT: Primary | ICD-10-CM

## 2024-05-02 DIAGNOSIS — K21.9 GERD (GASTROESOPHAGEAL REFLUX DISEASE): ICD-10-CM

## 2024-05-02 RX ORDER — GLYCOPYRROLATE 0.2 MG/ML
0.2 INJECTION INTRAMUSCULAR; INTRAVENOUS ONCE
Status: CANCELLED | OUTPATIENT
Start: 2024-05-10

## 2024-05-02 RX ORDER — SODIUM CHLORIDE, SODIUM LACTATE, POTASSIUM CHLORIDE, CALCIUM CHLORIDE 600; 310; 30; 20 MG/100ML; MG/100ML; MG/100ML; MG/100ML
INJECTION, SOLUTION INTRAVENOUS CONTINUOUS
Status: CANCELLED | OUTPATIENT
Start: 2024-05-10

## 2024-05-02 NOTE — DISCHARGE INSTRUCTIONS

## 2024-05-06 ENCOUNTER — HOSPITAL ENCOUNTER (OUTPATIENT)
Dept: RADIOLOGY | Facility: HOSPITAL | Age: 59
Discharge: HOME | End: 2024-05-06
Attending: SURGERY
Payer: MEDICAID

## 2024-05-06 DIAGNOSIS — K21.9 GASTROESOPHAGEAL REFLUX DISEASE WITHOUT ESOPHAGITIS: ICD-10-CM

## 2024-05-06 PROCEDURE — 78264 GASTRIC EMPTYING IMG STUDY: CPT | Mod: TC

## 2024-05-06 PROCEDURE — A9541 TC99M SULFUR COLLOID: HCPCS | Performed by: SURGERY

## 2024-05-06 RX ORDER — TECHNETIUM TC 99M SULFUR COLLOID 2 MG
2 KIT MISCELLANEOUS
Status: COMPLETED | OUTPATIENT
Start: 2024-05-06 | End: 2024-05-06

## 2024-05-06 RX ADMIN — TECHNETIUM TC 99M SULFUR COLLOID 2 MILLICURIE: KIT at 08:05

## 2024-05-09 ENCOUNTER — ANESTHESIA EVENT (OUTPATIENT)
Dept: GASTROENTEROLOGY | Facility: HOSPITAL | Age: 59
End: 2024-05-09
Payer: MEDICAID

## 2024-05-09 NOTE — ANESTHESIA PREPROCEDURE EVALUATION
2024  Roya Wills is a 59 y.o., female.  Anesthesia History    No specialty history recorded    Other Medical History   Known health problems: none Hyperlipidemia   Hypertension Anxiety and depression   Insomnia Unspecified osteoarthritis, unspecified site     Surgical History    CARPAL TUNNEL RELEASE  SECTION    SECTION  SECTION WITH TUBAL LIGATION    SECTION LAPAROSCOPIC CHOLECYSTECTOMY WITH CHOLANGIOGRAPHY     COVID-19 Risk of Complications  Current as of yesterday    0 0 to < 3 Points: Low Risk   3 to < 6 Points: Medium Risk   6 to 16 Points: High Risk     Last Change: N/A      Details   Substance History    Smoking Status: Former   Passive Exposure: Never   Smokeless Tobacco Status: Never   Alcohol use: Never   Drug use: Never     Anesthesia Family History    Problem Relations (Age of Onset)   No history of this type found      Current Gender Identity    Questions Responses   Patient's Gender Identity: Female   Patient's sex assigned at birth: Female          Pre-op Assessment    I have reviewed the Patient Summary Reports.     I have reviewed the Nursing Notes. I have reviewed the NPO Status.   I have reviewed the Medications.     Review of Systems  Anesthesia Hx:  No problems with previous Anesthesia             Denies Family Hx of Anesthesia complications.    Denies Personal Hx of Anesthesia complications.                    Social:  Former Smoker       Hematology/Oncology:  Hematology Normal   Oncology Normal                                   EENT/Dental:  EENT/Dental Normal           Cardiovascular:  Exercise tolerance: poor   Hypertension    Dysrhythmias                                    Pulmonary:  Pulmonary Normal                       Renal/:  Renal/ Normal                 Hepatic/GI:  Hepatic/GI Normal                 Musculoskeletal:  Arthritis                Neurological:  Neurology Normal                                      Endocrine:  Endocrine Normal            Dermatological:  Skin Normal    Psych:  Psychiatric History anxiety depression                Physical Exam  General: Well nourished, Cooperative, Alert and Oriented    Airway:  Mallampati: II / II  Mouth Opening: Normal  TM Distance: Normal  Tongue: Normal  Neck ROM: Extension Decreased, Flexion Decreased    Dental:  Dentures        Anesthesia Plan  Type of Anesthesia, risks & benefits discussed:    Anesthesia Type: MAC  Intra-op Monitoring Plan: Standard ASA Monitors  Post Op Pain Control Plan: multimodal analgesia  Induction:  IV  Airway Plan: Direct  Informed Consent: Informed consent signed with the Patient and all parties understand the risks and agree with anesthesia plan.  All questions answered. Patient consented to blood products? Yes  ASA Score: 3  Day of Surgery Review of History & Physical: H&P Update referred to the surgeon/provider.I have interviewed and examined the patient. I have reviewed the patient's H&P dated: There are no significant changes.     Ready For Surgery From Anesthesia Perspective.     .

## 2024-05-10 ENCOUNTER — HOSPITAL ENCOUNTER (OUTPATIENT)
Dept: GASTROENTEROLOGY | Facility: HOSPITAL | Age: 59
Discharge: HOME | End: 2024-05-10
Attending: SURGERY
Payer: MEDICAID

## 2024-05-10 ENCOUNTER — ANESTHESIA (OUTPATIENT)
Dept: GASTROENTEROLOGY | Facility: HOSPITAL | Age: 59
End: 2024-05-10
Payer: MEDICAID

## 2024-05-10 DIAGNOSIS — K21.9 GERD (GASTROESOPHAGEAL REFLUX DISEASE): ICD-10-CM

## 2024-05-10 DIAGNOSIS — K21.9 GASTROESOPHAGEAL REFLUX DISEASE, UNSPECIFIED WHETHER ESOPHAGITIS PRESENT: ICD-10-CM

## 2024-05-10 PROCEDURE — 37000008 HC ANESTHESIA 1ST 15 MINUTES

## 2024-05-10 PROCEDURE — 63600175 PHARM REV CODE 636 W HCPCS: Performed by: SURGERY

## 2024-05-10 PROCEDURE — 43239 EGD BIOPSY SINGLE/MULTIPLE: CPT | Performed by: SURGERY

## 2024-05-10 PROCEDURE — 25000003 PHARM REV CODE 250: Performed by: NURSE ANESTHETIST, CERTIFIED REGISTERED

## 2024-05-10 PROCEDURE — 63600175 PHARM REV CODE 636 W HCPCS: Performed by: NURSE ANESTHETIST, CERTIFIED REGISTERED

## 2024-05-10 PROCEDURE — D9220A PRA ANESTHESIA: Mod: ,,, | Performed by: NURSE ANESTHETIST, CERTIFIED REGISTERED

## 2024-05-10 PROCEDURE — 25000003 PHARM REV CODE 250: Performed by: SURGERY

## 2024-05-10 PROCEDURE — 27201423 OPTIME MED/SURG SUP & DEVICES STERILE SUPPLY

## 2024-05-10 RX ORDER — LISINOPRIL 5 MG/1
5 TABLET ORAL
Status: COMPLETED | OUTPATIENT
Start: 2024-05-10 | End: 2024-05-10

## 2024-05-10 RX ORDER — PROPOFOL 10 MG/ML
VIAL (ML) INTRAVENOUS
Status: DISCONTINUED | OUTPATIENT
Start: 2024-05-10 | End: 2024-05-10

## 2024-05-10 RX ORDER — LIDOCAINE HYDROCHLORIDE 20 MG/ML
INJECTION INTRAVENOUS
Status: DISCONTINUED | OUTPATIENT
Start: 2024-05-10 | End: 2024-05-10

## 2024-05-10 RX ORDER — METOPROLOL TARTRATE 25 MG/1
25 TABLET, FILM COATED ORAL
Status: COMPLETED | OUTPATIENT
Start: 2024-05-10 | End: 2024-05-10

## 2024-05-10 RX ORDER — GLYCOPYRROLATE 0.2 MG/ML
0.2 INJECTION INTRAMUSCULAR; INTRAVENOUS ONCE
Status: COMPLETED | OUTPATIENT
Start: 2024-05-10 | End: 2024-05-10

## 2024-05-10 RX ORDER — SODIUM CHLORIDE, SODIUM LACTATE, POTASSIUM CHLORIDE, CALCIUM CHLORIDE 600; 310; 30; 20 MG/100ML; MG/100ML; MG/100ML; MG/100ML
INJECTION, SOLUTION INTRAVENOUS CONTINUOUS
Status: DISCONTINUED | OUTPATIENT
Start: 2024-05-10 | End: 2024-05-11 | Stop reason: HOSPADM

## 2024-05-10 RX ADMIN — METOPROLOL TARTRATE 25 MG: 25 TABLET, FILM COATED ORAL at 09:05

## 2024-05-10 RX ADMIN — LISINOPRIL 5 MG: 5 TABLET ORAL at 09:05

## 2024-05-10 RX ADMIN — SODIUM CHLORIDE, POTASSIUM CHLORIDE, SODIUM LACTATE AND CALCIUM CHLORIDE: 600; 310; 30; 20 INJECTION, SOLUTION INTRAVENOUS at 08:05

## 2024-05-10 RX ADMIN — PROPOFOL 30 MG: 10 INJECTION, EMULSION INTRAVENOUS at 10:05

## 2024-05-10 RX ADMIN — LIDOCAINE HYDROCHLORIDE 50 MG: 20 INJECTION, SOLUTION INTRAVENOUS at 10:05

## 2024-05-10 RX ADMIN — GLYCOPYRROLATE 0.2 MG: 0.2 INJECTION INTRAMUSCULAR; INTRAVENOUS at 08:05

## 2024-05-10 RX ADMIN — PROPOFOL 70 MG: 10 INJECTION, EMULSION INTRAVENOUS at 10:05

## 2024-05-10 NOTE — ANESTHESIA POSTPROCEDURE EVALUATION
Anesthesia Post Evaluation    Patient: Roya Wills    Procedure(s) Performed: * No procedures listed *    Final Anesthesia Type: MAC      Patient location during evaluation: GI PACU  Patient participation: Yes- Able to Participate  Level of consciousness: awake and alert, awake and oriented  Post-procedure vital signs: reviewed and stable  Pain management: adequate  Airway patency: patent    PONV status at discharge: No PONV  Anesthetic complications: no      Cardiovascular status: blood pressure returned to baseline  Respiratory status: unassisted, room air and spontaneous ventilation  Hydration status: euvolemic  Follow-up not needed.              Vitals Value Taken Time   /88 05/10/24 0917   Temp 36 °C (96.8 °F) 05/10/24 0845   Pulse 67 05/10/24 0917   Resp 20 05/10/24 0845   SpO2 100 % 05/10/24 0845         No case tracking events are documented in the log.      Pain/Chiqui Score: No data recorded

## 2024-05-10 NOTE — DISCHARGE INSTRUCTIONS
Follow-up with Dr. Byers  Diet:  as tolerated  Activity:  decrease activity today, no driving today, resume all activity tomorrow  Notify MD:  increased swelling of abdomen, difficulty breathing/swallowing, excessive nausea/vomiting, excessive bright red bleeding from mouth/vomit,  Medications:  continue your home medications, keep a list of your home medications at all times for emergencies.

## 2024-05-13 VITALS
SYSTOLIC BLOOD PRESSURE: 109 MMHG | WEIGHT: 127 LBS | RESPIRATION RATE: 20 BRPM | HEART RATE: 75 BPM | TEMPERATURE: 97 F | DIASTOLIC BLOOD PRESSURE: 72 MMHG | BODY MASS INDEX: 24.94 KG/M2 | OXYGEN SATURATION: 99 % | HEIGHT: 60 IN

## 2024-05-14 ENCOUNTER — HOSPITAL ENCOUNTER (EMERGENCY)
Facility: HOSPITAL | Age: 59
Discharge: HOME | End: 2024-05-15
Attending: FAMILY MEDICINE
Payer: MEDICAID

## 2024-05-14 DIAGNOSIS — R10.9 ABDOMINAL PAIN, UNSPECIFIED ABDOMINAL LOCATION: Primary | ICD-10-CM

## 2024-05-14 DIAGNOSIS — R07.9 CHEST PAIN: ICD-10-CM

## 2024-05-14 LAB
ALBUMIN SERPL-MCNC: 3.8 G/DL (ref 3.4–5)
ALBUMIN/GLOB SERPL: 1.5 RATIO
ALP SERPL-CCNC: 150 UNIT/L (ref 50–144)
ALT SERPL-CCNC: 37 UNIT/L (ref 1–45)
ANION GAP SERPL CALC-SCNC: 4 MEQ/L (ref 2–13)
AST SERPL-CCNC: 45 UNIT/L (ref 14–36)
BASOPHILS # BLD AUTO: 0.05 X10(3)/MCL (ref 0.01–0.08)
BASOPHILS NFR BLD AUTO: 0.6 % (ref 0.1–1.2)
BILIRUB SERPL-MCNC: 0.3 MG/DL (ref 0–1)
BNP BLD-MCNC: 205 PG/ML (ref 0–124.9)
BUN SERPL-MCNC: 9 MG/DL (ref 7–20)
CALCIUM SERPL-MCNC: 9 MG/DL (ref 8.4–10.2)
CHLORIDE SERPL-SCNC: 107 MMOL/L (ref 98–110)
CO2 SERPL-SCNC: 28 MMOL/L (ref 21–32)
CREAT SERPL-MCNC: 0.95 MG/DL (ref 0.66–1.25)
CREAT/UREA NIT SERPL: 9 (ref 12–20)
D DIMER PPP IA.FEU-MCNC: 0.54 MG/L (ref 0.19–0.5)
EOSINOPHIL # BLD AUTO: 0.12 X10(3)/MCL (ref 0.04–0.36)
EOSINOPHIL NFR BLD AUTO: 1.5 % (ref 0.7–7)
ERYTHROCYTE [DISTWIDTH] IN BLOOD BY AUTOMATED COUNT: 12.6 % (ref 11–14.5)
GFR SERPLBLD CREATININE-BSD FMLA CKD-EPI: 70 ML/MIN/1.73/M2
GLOBULIN SER-MCNC: 2.6 GM/DL (ref 2–3.9)
GLUCOSE SERPL-MCNC: 111 MG/DL (ref 70–115)
HCT VFR BLD AUTO: 36.6 % (ref 36–48)
HGB BLD-MCNC: 12.2 G/DL (ref 11.8–16)
IMM GRANULOCYTES # BLD AUTO: 0.04 X10(3)/MCL (ref 0–0.03)
IMM GRANULOCYTES NFR BLD AUTO: 0.5 % (ref 0–0.5)
LYMPHOCYTES # BLD AUTO: 3.65 X10(3)/MCL (ref 1.16–3.74)
LYMPHOCYTES NFR BLD AUTO: 45.4 % (ref 20–55)
MAGNESIUM SERPL-MCNC: 2.2 MG/DL (ref 1.8–2.4)
MCH RBC QN AUTO: 30.9 PG (ref 27–34)
MCHC RBC AUTO-ENTMCNC: 33.3 G/DL (ref 31–37)
MCV RBC AUTO: 92.7 FL (ref 79–99)
MONOCYTES # BLD AUTO: 0.59 X10(3)/MCL (ref 0.24–0.36)
MONOCYTES NFR BLD AUTO: 7.3 % (ref 4.7–12.5)
NEUTROPHILS # BLD AUTO: 3.59 X10(3)/MCL (ref 1.56–6.13)
NEUTROPHILS NFR BLD AUTO: 44.7 % (ref 37–73)
NRBC BLD AUTO-RTO: 0 %
PLATELET # BLD AUTO: 283 X10(3)/MCL (ref 140–371)
PMV BLD AUTO: 9.2 FL (ref 9.4–12.4)
POTASSIUM SERPL-SCNC: 3.7 MMOL/L (ref 3.5–5.1)
PROT SERPL-MCNC: 6.4 GM/DL (ref 6.3–8.2)
RBC # BLD AUTO: 3.95 X10(6)/MCL (ref 4–5.1)
SODIUM SERPL-SCNC: 139 MMOL/L (ref 136–145)
TROPONIN I SERPL-MCNC: <0.012 NG/ML (ref 0–0.03)
WBC # SPEC AUTO: 8.04 X10(3)/MCL (ref 4–11.5)

## 2024-05-14 PROCEDURE — 82150 ASSAY OF AMYLASE: CPT | Performed by: FAMILY MEDICINE

## 2024-05-14 PROCEDURE — 96376 TX/PRO/DX INJ SAME DRUG ADON: CPT

## 2024-05-14 PROCEDURE — 96361 HYDRATE IV INFUSION ADD-ON: CPT

## 2024-05-14 PROCEDURE — 63600175 PHARM REV CODE 636 W HCPCS: Performed by: FAMILY MEDICINE

## 2024-05-14 PROCEDURE — 93010 ELECTROCARDIOGRAM REPORT: CPT | Mod: ,,, | Performed by: INTERNAL MEDICINE

## 2024-05-14 PROCEDURE — 93005 ELECTROCARDIOGRAM TRACING: CPT

## 2024-05-14 PROCEDURE — 85379 FIBRIN DEGRADATION QUANT: CPT | Performed by: FAMILY MEDICINE

## 2024-05-14 PROCEDURE — 96374 THER/PROPH/DIAG INJ IV PUSH: CPT

## 2024-05-14 PROCEDURE — 99285 EMERGENCY DEPT VISIT HI MDM: CPT | Mod: 25

## 2024-05-14 PROCEDURE — 83880 ASSAY OF NATRIURETIC PEPTIDE: CPT | Performed by: FAMILY MEDICINE

## 2024-05-14 PROCEDURE — 83735 ASSAY OF MAGNESIUM: CPT | Performed by: FAMILY MEDICINE

## 2024-05-14 PROCEDURE — 80053 COMPREHEN METABOLIC PANEL: CPT | Performed by: FAMILY MEDICINE

## 2024-05-14 PROCEDURE — 85025 COMPLETE CBC W/AUTO DIFF WBC: CPT | Performed by: FAMILY MEDICINE

## 2024-05-14 PROCEDURE — 25000003 PHARM REV CODE 250: Performed by: FAMILY MEDICINE

## 2024-05-14 PROCEDURE — 96375 TX/PRO/DX INJ NEW DRUG ADDON: CPT

## 2024-05-14 PROCEDURE — 84484 ASSAY OF TROPONIN QUANT: CPT | Performed by: FAMILY MEDICINE

## 2024-05-14 PROCEDURE — 83690 ASSAY OF LIPASE: CPT | Performed by: FAMILY MEDICINE

## 2024-05-14 RX ORDER — ONDANSETRON HYDROCHLORIDE 2 MG/ML
4 INJECTION, SOLUTION INTRAVENOUS
Status: COMPLETED | OUTPATIENT
Start: 2024-05-14 | End: 2024-05-14

## 2024-05-14 RX ORDER — ASPIRIN 325 MG
325 TABLET ORAL
Status: COMPLETED | OUTPATIENT
Start: 2024-05-14 | End: 2024-05-14

## 2024-05-14 RX ORDER — MORPHINE SULFATE 4 MG/ML
4 INJECTION, SOLUTION INTRAMUSCULAR; INTRAVENOUS
Status: COMPLETED | OUTPATIENT
Start: 2024-05-14 | End: 2024-05-14

## 2024-05-14 RX ORDER — MORPHINE SULFATE 2 MG/ML
2 INJECTION, SOLUTION INTRAMUSCULAR; INTRAVENOUS
Status: COMPLETED | OUTPATIENT
Start: 2024-05-14 | End: 2024-05-14

## 2024-05-14 RX ADMIN — SODIUM CHLORIDE 1000 ML: 9 INJECTION, SOLUTION INTRAVENOUS at 10:05

## 2024-05-14 RX ADMIN — MORPHINE SULFATE 2 MG: 2 INJECTION, SOLUTION INTRAMUSCULAR; INTRAVENOUS at 11:05

## 2024-05-14 RX ADMIN — MORPHINE SULFATE 4 MG: 4 INJECTION, SOLUTION INTRAMUSCULAR; INTRAVENOUS at 11:05

## 2024-05-14 RX ADMIN — ONDANSETRON 4 MG: 2 INJECTION INTRAMUSCULAR; INTRAVENOUS at 11:05

## 2024-05-14 RX ADMIN — ASPIRIN 325 MG ORAL TABLET 325 MG: 325 PILL ORAL at 10:05

## 2024-05-15 VITALS
HEART RATE: 79 BPM | OXYGEN SATURATION: 98 % | RESPIRATION RATE: 18 BRPM | WEIGHT: 132.31 LBS | BODY MASS INDEX: 25.97 KG/M2 | DIASTOLIC BLOOD PRESSURE: 68 MMHG | SYSTOLIC BLOOD PRESSURE: 118 MMHG | HEIGHT: 60 IN | TEMPERATURE: 97 F

## 2024-05-15 LAB
AMYLASE SERPL-CCNC: 56 UNIT/L (ref 30–100)
LIPASE SERPL-CCNC: 113 U/L (ref 23–300)
OHS QRS DURATION: 80 MS
OHS QTC CALCULATION: 452 MS
TROPONIN I SERPL-MCNC: <0.012 NG/ML (ref 0–0.03)

## 2024-05-15 PROCEDURE — 63600175 PHARM REV CODE 636 W HCPCS: Performed by: FAMILY MEDICINE

## 2024-05-15 PROCEDURE — 25000003 PHARM REV CODE 250: Performed by: FAMILY MEDICINE

## 2024-05-15 RX ORDER — OXYCODONE AND ACETAMINOPHEN 5; 325 MG/1; MG/1
1 TABLET ORAL EVERY 4 HOURS PRN
Qty: 10 TABLET | Refills: 0 | Status: SHIPPED | OUTPATIENT
Start: 2024-05-15

## 2024-05-15 RX ORDER — OXYCODONE AND ACETAMINOPHEN 5; 325 MG/1; MG/1
1 TABLET ORAL
Status: COMPLETED | OUTPATIENT
Start: 2024-05-15 | End: 2024-05-15

## 2024-05-15 RX ORDER — HYDROMORPHONE HYDROCHLORIDE 1 MG/ML
1 INJECTION, SOLUTION INTRAMUSCULAR; INTRAVENOUS; SUBCUTANEOUS
Status: COMPLETED | OUTPATIENT
Start: 2024-05-15 | End: 2024-05-15

## 2024-05-15 RX ADMIN — OXYCODONE HYDROCHLORIDE AND ACETAMINOPHEN 1 TABLET: 5; 325 TABLET ORAL at 01:05

## 2024-05-15 RX ADMIN — HYDROMORPHONE HYDROCHLORIDE 1 MG: 1 INJECTION, SOLUTION INTRAMUSCULAR; INTRAVENOUS; SUBCUTANEOUS at 12:05

## 2024-05-15 NOTE — ED PROVIDER NOTES
Encounter Date: 2024       History     Chief Complaint   Patient presents with    Chest Pain    Numbness    Weakness     Pt reports that she has been having chest tightness and her left arm is going numb starting one hour pta. Pt is seeing cardiologist for new onset heart problems.      Patient presents for evaluation of chest pain patient notes having left-sided chest pain that radiates to her left arm since this morning patient had episode started earlier this morning lasted for proximally hour and then restarted this afternoon patient describes the pain as pressure-like nonradiating pain that has been constant since this afternoon at present is moderate.  Patient also notes having dizziness and lightheadedness with her symptoms patient denies fever chills cough congestion or any other associated symptoms at present    The history is provided by the patient.     Review of patient's allergies indicates:   Allergen Reactions    Ibuprofen Other (See Comments)     Other reaction(s): Abdominal Pain, Stomach ulcer    Sulfamethoxazole-trimethoprim Nausea And Vomiting     Past Medical History:   Diagnosis Date    Anxiety and depression     Hyperlipidemia     Hypertension     Insomnia     Known health problems: none     Unspecified osteoarthritis, unspecified site     bilateral knees     Past Surgical History:   Procedure Laterality Date    CARPAL TUNNEL RELEASE Bilateral      SECTION  1986     SECTION  1988     SECTION  1992     SECTION WITH TUBAL LIGATION  1990    LAPAROSCOPIC CHOLECYSTECTOMY WITH CHOLANGIOGRAPHY N/A 2024    Procedure: CHOLECYSTECTOMY, LAPAROSCOPIC, WITH CHOLANGIOGRAM;  Surgeon: Arron Byers MD;  Location: [facility];  Service: General;  Laterality: N/A;     Family History   Problem Relation Name Age of Onset    Colon cancer Mother          onset unknown    No Known Problems Brother      No Known Problems Sister      Breast cancer  Maternal Aunt          onset unknown    Colon cancer Maternal Cousin 1st cousin 59    Ovarian cancer Neg Hx      Uterine cancer Neg Hx      Cervical cancer Neg Hx       Social History     Tobacco Use    Smoking status: Former     Types: Cigarettes     Passive exposure: Never    Smokeless tobacco: Never    Tobacco comments:     Smoke About 2 a day   Substance Use Topics    Alcohol use: Never    Drug use: Never     Review of Systems   Constitutional: Negative.    HENT: Negative.     Eyes: Negative.    Respiratory: Negative.     Cardiovascular:  Positive for chest pain.   Gastrointestinal: Negative.    Endocrine: Negative.    Genitourinary: Negative.    Musculoskeletal: Negative.    Skin: Negative.    Allergic/Immunologic: Negative.    Neurological: Negative.    Hematological: Negative.    Psychiatric/Behavioral: Negative.         Physical Exam     Initial Vitals   BP Pulse Resp Temp SpO2   05/14/24 2134 05/14/24 2134 05/14/24 2134 05/14/24 2139 05/14/24 2134   (!) 154/94 89 (!) 24 98 °F (36.7 °C) 96 %      MAP       --                Physical Exam    Vitals reviewed.  Constitutional: She appears well-developed and well-nourished. She is not diaphoretic. No distress.   HENT:   Head: Normocephalic and atraumatic.   Mouth/Throat: No oropharyngeal exudate.   Eyes: Conjunctivae and EOM are normal. Pupils are equal, round, and reactive to light. Right eye exhibits no discharge. Left eye exhibits no discharge.   Neck: Neck supple. No thyromegaly present. No tracheal deviation present. No JVD present.   Normal range of motion.  Cardiovascular:  Normal rate, regular rhythm and normal heart sounds.     Exam reveals no gallop and no friction rub.       No murmur heard.  Pulmonary/Chest: Breath sounds normal. No stridor. No respiratory distress. She has no wheezes. She has no rhonchi. She has no rales.   Abdominal: Abdomen is soft. Bowel sounds are normal. She exhibits no distension and no mass. There is no abdominal tenderness.  There is no rebound and no guarding.   Musculoskeletal:         General: No tenderness or edema. Normal range of motion.      Cervical back: Normal range of motion and neck supple.     Neurological: She is alert and oriented to person, place, and time. She has normal reflexes. She displays normal reflexes. No cranial nerve deficit or sensory deficit. GCS score is 15. GCS eye subscore is 4. GCS verbal subscore is 5. GCS motor subscore is 6.   Skin: Skin is warm.   Psychiatric: She has a normal mood and affect.         ED Course   Procedures  Labs Reviewed   COMPREHENSIVE METABOLIC PANEL - Abnormal; Notable for the following components:       Result Value     (*)     AST 45 (*)     BUN/Creatinine Ratio 9 (*)     All other components within normal limits   NT-PRO NATRIURETIC PEPTIDE - Abnormal; Notable for the following components:    ProBNP 205.0 (*)     All other components within normal limits   D DIMER, QUANTITATIVE - Abnormal; Notable for the following components:    D-Dimer 0.54 (*)     All other components within normal limits   CBC WITH DIFFERENTIAL - Abnormal; Notable for the following components:    RBC 3.95 (*)     MPV 9.2 (*)     Mono # 0.59 (*)     IG# 0.04 (*)     All other components within normal limits   MAGNESIUM - Normal   TROPONIN I - Normal   TROPONIN I - Normal   AMYLASE - Normal   LIPASE - Normal   CBC W/ AUTO DIFFERENTIAL    Narrative:     The following orders were created for panel order CBC auto differential.  Procedure                               Abnormality         Status                     ---------                               -----------         ------                     CBC with Differential[[id number]]       Abnormal            Final result                 Please view results for these tests on the individual orders.     EKG Readings: (Independently Interpreted)   Initial Reading: No STEMI. Previous EKG: Compared with most recent EKG ST Segments: Normal ST Segments. T Waves:  Normal.   Normal sinus rhythm 89 beats per minute.  T-wave inversion lead 3 AVF T-wave flattening in V6 5 V6       Imaging Results              CT Abdomen Pelvis  Without Contrast (Preliminary result)  Result time 05/15/24 00:22:20      Preliminary result by Jefe Boyd Jr., MD (05/15/24 00:22:20)                   Narrative:    START OF REPORT:  Technique: CT of the abdomen and pelvis was performed with axial images as well as sagittal and coronal reconstruction images without intravenous contrast.    Comparison: Comparison is with study dated October 22, 2023.    Clinical History: ACUTE ABD PN STARTED WHILE IN ER TONIGHT.    Dosage Information: Automated Exposure Control was utilized.    Findings:  Lines and Tubes: None.  Thorax:  Lungs: There is mild nonspecific dependent change at the lung bases. No focal infiltrate or consolidation is seen.  Pleura: No effusions or thickening.  Heart: The heart size is within normal limits.  Abdomen:  Abdominal Wall: No abdominal wall pathology is seen.  Liver: The liver appears unremarkable.  Biliary System: No intrahepatic or extrahepatic biliary duct dilatation is seen.  Gallbladder: Surgical clips are now seen in the gallbladder fossa which may reflect prior cholecystectomy.  Pancreas: The pancreas appears unremarkable.  Spleen: The spleen appears unremarkable.  Adrenals: The adrenal glands appear unremarkable.  Kidneys: The kidneys appear unremarkable with no stones cysts masses or hydronephrosis.  Aorta: There is mild calcification of the abdominal aorta and its branches.  IVC: Unremarkable.  Bowel:  Esophagus: There is a stable small hiatal hernia.  Stomach: The stomach appears unremarkable.  Duodenum: Unremarkable appearing duodenum.  Small Bowel: There previously noted dilated and fluid-filled ileal bowel loops has resolved. The rest of the small bowels are unremarkable.  Colon: There is moderate stool in the colon which could reflect an element of  constipation. A few diverticula are seen in the sigmoid colon. No associated inflammatory stranding or pericolonic fluid is seen to suggest diverticulitis.  Appendix: The appendix appears unremarkable and is seen on series 3 image 54 to 59.  Peritoneum: No intraperitoneal free air or ascites is seen.    Pelvis:  Bladder: The bladder appears unremarkable.  Female:  Uterus: The uterus is not identified.  Ovaries: No adnexal masses are seen.    Bony structures:  Dorsal Spine: There is mild spondylosis of the visualized dorsal spine.  Bony Pelvis: The visualized bony structures of the pelvis appear unremarkable.      Impression:  1. There is moderate stool in the colon which could reflect an element of constipation.  2. No acute intraabdominal or pelvic solid organ or bowel pathology identified. Details and other findings as discussed above.                                         CT Chest Without Contrast (Preliminary result)  Result time 05/14/24 22:53:16      Preliminary result by Jefe Boyd Jr., MD (05/14/24 22:53:16)                   Narrative:    START OF REPORT:  Technique: CT Scan of the chest was performed without intravenous contrast with direct axial as well as sagittal and, coronal, reconstruction images.    Dosage Information: Automated Exposure Control was utilized.    Comparison: None.    Clinical History: WEAK DIZZY CP.    Findings:  Soft Tissues: Unremarkable.  Lines and Tubes: None.  Neck: The left lobe of the thyroid gland is partially visualized and appears enlarged with lobulated contour. It measures 3.8 x 2.7 cm on series 3 image 3.  Mediastinum: The mediastinal structures are within normal limits.  Heart: The heart appears unremarkable.  Aorta: Unremarkable appearing aorta.  Pulmonary Arteries: Unremarkable.  Lungs: There is mild non specific dependent change at the lung bases. Linear atelectatic band is seen in the right middle lobe on series 10 image 46. Otherwise clear lungs with no  focal infiltrate or consolidation.  Pleura: No effusions or pneumothorax are identified.  Bony Structures: The visualized bony structures appear unremarkable.  Abdomen: Post-cholecystectomy status is noted. Rest of the visualized upper abdominal organs appear unremarkable.      Impression:  1. The left lobe of the thyroid gland is partially visualized and appears enlarged with lobulated contour. It measures 3.8 x 2.7 cm on series 3 image 3.  2. No acute intrathoracic abnormality detected. Details and other findings as discussed above.                                         CT Head Without Contrast (Preliminary result)  Result time 05/14/24 22:45:11      Preliminary result by Jefe Boyd Jr., MD (05/14/24 22:45:11)                   Narrative:    START OF REPORT:  Technique: CT of the head was performed without intravenous contrast with axial as well as coronal and sagittal images.    Comparison: None.    Dosage Information: Automated exposure control was utilized.    Clinical history: WEAK DIZZY CP.    Findings:  Hemorrhage: No acute intracranial hemorrhage is seen.  CSF spaces: The ventricles sulci and basal cisterns are within normal limits.  Brain parenchyma: Unremarkable with preservation of the grey white junction throughout.  Cerebellum: Unremarkable.  Sella and skull base: The sella appears to be within normal limits for age.  Cerebellopontine angles: Within normal limits.  Herniation: None.  Intracranial calcifications: Incidental note is made of bilateral choroid plexus calcification. Incidental note is made of some pineal region calcification.  Calvarium: No acute linear or depressed skull fracture is seen.    Maxillofacial Structures:  Paranasal sinuses: The visualized paranasal sinuses appear clear with no mucoperiosteal thickening or air fluid levels identified.  Orbits: The orbits appear unremarkable.  Zygomatic arches: The zygomatic arches are intact and unremarkable.  Temporal bones and  mastoids: The temporal bones and mastoids appear unremarkable.  TMJ: The mandibular condyles appear normally placed with respect to the mandibular fossa.      Impression:  1. No acute intracranial process identified. Details as above.                                         Medications   oxyCODONE-acetaminophen 5-325 mg per tablet 1 tablet (has no administration in time range)   aspirin tablet 325 mg (325 mg Oral Given 5/14/24 2218)   sodium chloride 0.9% bolus 1,000 mL 1,000 mL (0 mLs Intravenous Stopped 5/14/24 2338)   morphine injection 2 mg (2 mg Intravenous Given 5/14/24 2312)   morphine injection 4 mg (4 mg Intravenous Given 5/14/24 2342)   ondansetron injection 4 mg (4 mg Intravenous Given 5/14/24 2343)   HYDROmorphone injection 1 mg (1 mg Intravenous Given 5/15/24 0028)     Medical Decision Making  Amount and/or Complexity of Data Reviewed  Labs: ordered. Decision-making details documented in ED Course.  Radiology: ordered.    Risk  OTC drugs.  Prescription drug management.               ED Course as of 05/15/24 0111   Tue May 14, 2024   2334 eGFR: 70 [JF]      ED Course User Index  [JF] Hermes Sosa MD                           Clinical Impression:  Final diagnoses:  [R07.9] Chest pain  [R10.9] Abdominal pain, unspecified abdominal location (Primary)          ED Disposition Condition    Discharge Stable          ED Prescriptions       Medication Sig Dispense Start Date End Date Auth. Provider    oxyCODONE-acetaminophen (PERCOCET) 5-325 mg per tablet Take 1 tablet by mouth every 4 (four) hours as needed for Pain. 10 tablet 5/15/2024 -- Hermes Sosa MD          Follow-up Information    None          Hermes Sosa MD  05/15/24 0111

## 2024-05-16 LAB — BEAKER SEE SCANNED REPORT: NORMAL

## 2024-05-21 ENCOUNTER — LAB VISIT (OUTPATIENT)
Dept: LAB | Facility: HOSPITAL | Age: 59
End: 2024-05-21
Attending: NURSE PRACTITIONER
Payer: MEDICAID

## 2024-05-21 DIAGNOSIS — R53.83 FATIGUE, UNSPECIFIED TYPE: ICD-10-CM

## 2024-05-21 DIAGNOSIS — D64.9 ANEMIA, UNSPECIFIED TYPE: Primary | ICD-10-CM

## 2024-05-21 DIAGNOSIS — E78.5 HYPERLIPIDEMIA, UNSPECIFIED HYPERLIPIDEMIA TYPE: ICD-10-CM

## 2024-05-21 LAB
ANION GAP SERPL CALC-SCNC: 5 MEQ/L (ref 2–13)
BUN SERPL-MCNC: 15 MG/DL (ref 7–20)
CALCIUM SERPL-MCNC: 8.9 MG/DL (ref 8.4–10.2)
CHLORIDE SERPL-SCNC: 107 MMOL/L (ref 98–110)
CHOLEST SERPL-MCNC: 142 MG/DL (ref 0–200)
CO2 SERPL-SCNC: 29 MMOL/L (ref 21–32)
CREAT SERPL-MCNC: 1.04 MG/DL (ref 0.66–1.25)
CREAT/UREA NIT SERPL: 14 (ref 12–20)
GFR SERPLBLD CREATININE-BSD FMLA CKD-EPI: 63 ML/MIN/1.73/M2
GLUCOSE SERPL-MCNC: 103 MG/DL (ref 70–115)
HDLC SERPL-MCNC: 58 MG/DL (ref 40–60)
LDLC SERPL DIRECT ASSAY-SCNC: 71.9 MG/DL (ref 30–100)
POTASSIUM SERPL-SCNC: 3.9 MMOL/L (ref 3.5–5.1)
SODIUM SERPL-SCNC: 141 MMOL/L (ref 136–145)
TRIGL SERPL-MCNC: 90 MG/DL (ref 30–200)

## 2024-05-21 PROCEDURE — 80048 BASIC METABOLIC PNL TOTAL CA: CPT

## 2024-05-21 PROCEDURE — 80061 LIPID PANEL: CPT

## 2024-05-21 PROCEDURE — 36415 COLL VENOUS BLD VENIPUNCTURE: CPT

## 2024-05-22 ENCOUNTER — HOSPITAL ENCOUNTER (OUTPATIENT)
Facility: HOSPITAL | Age: 59
Discharge: HOME | End: 2024-05-22
Attending: INTERNAL MEDICINE | Admitting: INTERNAL MEDICINE
Payer: MEDICAID

## 2024-05-22 PROCEDURE — 91010 ESOPHAGUS MOTILITY STUDY: CPT | Mod: 73 | Performed by: INTERNAL MEDICINE

## 2024-05-22 PROCEDURE — 27201423 OPTIME MED/SURG SUP & DEVICES STERILE SUPPLY: Performed by: INTERNAL MEDICINE

## 2024-05-22 RX ORDER — LIDOCAINE HYDROCHLORIDE 20 MG/ML
SOLUTION OROPHARYNGEAL
Status: DISCONTINUED
Start: 2024-05-22 | End: 2024-05-22 | Stop reason: HOSPADM

## 2024-05-22 NOTE — NURSING
Unable to tolerate manometry portion of procedure secondary to gagging and patient terminating intubation.  PH probe was placed into Left nare at 42cm with radiologist guidance.

## 2024-05-25 DIAGNOSIS — R06.02 SHORTNESS OF BREATH: Primary | ICD-10-CM

## 2024-06-05 DIAGNOSIS — R05.9 COUGH: Primary | ICD-10-CM

## 2024-06-05 NOTE — DISCHARGE SUMMARY
[facility]  Discharge Note  Short Stay    EGD      OUTCOME: Patient tolerated treatment/procedure well without complication and is now ready for discharge.    DISPOSITION: Home or Self Care    FINAL DIAGNOSIS:  <principal problem not specified>    FOLLOWUP: In clinic    DISCHARGE INSTRUCTIONS:  No discharge procedures on file.      Clinical Reference Documents Added to Patient Instructions         Document    UPPER GI ENDOSCOPY DISCHARGE INSTRUCTIONS (ENGLISH)            TIME SPENT ON DISCHARGE: 5 minutes

## (undated) DEVICE — SUT 0 VICRYL / UR6 (J603)

## (undated) DEVICE — SET TUB INSUFFLATION SUC 20L

## (undated) DEVICE — Device

## (undated) DEVICE — SPONGE LAP 18X18 PREWASHED

## (undated) DEVICE — ADHESIVE DERMABOND ADVANCED

## (undated) DEVICE — CYLINDER 1000 ML GRADUATED

## (undated) DEVICE — NDL SAFETY 22G X 1.5 ECLIPSE

## (undated) DEVICE — SLEEVE KII ADV FIX 5X100MM

## (undated) DEVICE — TROCAR KII SHLD BLDED 5X100MM

## (undated) DEVICE — SPONGE GAUZE 16PLY 4X4

## (undated) DEVICE — SYR LUER LOCK 20ML

## (undated) DEVICE — DRAPE INCISE IOBAN 2 23X17IN

## (undated) DEVICE — GLOVE PROTEXIS HYDROGEL SZ6.5

## (undated) DEVICE — HOLDER SCALPEL SURGICAL GOLD

## (undated) DEVICE — DISSECTOR EPIX LAPA 5MMX35CM

## (undated) DEVICE — CATH VERSAFLEX ZNIS+8R

## (undated) DEVICE — DRAPE C ARM 42 X 120 10/BX

## (undated) DEVICE — NDL PNEUMO INSUFFLATI 120MM

## (undated) DEVICE — SYR 10CC LUER LOCK

## (undated) DEVICE — CONTRAST ISOVUE 370 100ML

## (undated) DEVICE — CATH CHOLANGIOGRAM 13IN

## (undated) DEVICE — APPLIER CLIP EPIX UNIV 5X34

## (undated) DEVICE — SCISSOR 5MMX35CM DIRECT DRIVE

## (undated) DEVICE — IRRIGATOR HYDRO-SURG PLUS 5MM

## (undated) DEVICE — NDL INSUFFLATION VERRES 120MM

## (undated) DEVICE — DRAPE DEVON INSTRUMENT 10X16IN

## (undated) DEVICE — BAG SPEC RETRV ENDO 4X6IN DISP

## (undated) DEVICE — ELECTRODE MEGADYNE L-WIRE 33CM

## (undated) DEVICE — SUT GUT PL. 4-0 27 FS-2

## (undated) DEVICE — FILTER LAPAROSCOPIC SMOKE EVAC

## (undated) DEVICE — MANOSCAN